# Patient Record
Sex: MALE | Race: WHITE | NOT HISPANIC OR LATINO | Employment: OTHER | URBAN - METROPOLITAN AREA
[De-identification: names, ages, dates, MRNs, and addresses within clinical notes are randomized per-mention and may not be internally consistent; named-entity substitution may affect disease eponyms.]

---

## 2023-05-02 DIAGNOSIS — M50.90 CERVICAL DISC DISEASE: ICD-10-CM

## 2023-05-02 PROBLEM — M19.90 ARTHRITIS: Status: ACTIVE | Noted: 2023-05-02

## 2023-05-02 RX ORDER — MELOXICAM 7.5 MG/1
1 TABLET ORAL DAILY
COMMUNITY
Start: 2016-10-03 | End: 2023-05-02 | Stop reason: SDUPTHER

## 2023-05-02 RX ORDER — MELOXICAM 7.5 MG/1
7.5 TABLET ORAL DAILY
Qty: 30 TABLET | Refills: 0 | Status: SHIPPED | OUTPATIENT
Start: 2023-05-02 | End: 2023-06-26 | Stop reason: DRUGHIGH

## 2023-05-26 ENCOUNTER — APPOINTMENT (OUTPATIENT)
Dept: PRIMARY CARE | Facility: CLINIC | Age: 78
End: 2023-05-26
Payer: MEDICARE

## 2023-06-22 PROBLEM — I10 BENIGN ESSENTIAL HYPERTENSION: Status: ACTIVE | Noted: 2023-06-22

## 2023-06-22 PROBLEM — R79.9 ABNORMAL BLOOD CHEMISTRY: Status: ACTIVE | Noted: 2023-06-22

## 2023-06-22 PROBLEM — G47.00 INSOMNIA: Status: ACTIVE | Noted: 2023-06-22

## 2023-06-22 PROBLEM — E04.1 THYROID NODULE: Status: ACTIVE | Noted: 2023-06-22

## 2023-06-22 PROBLEM — M51.369 DEGENERATIVE DISC DISEASE, LUMBAR: Status: ACTIVE | Noted: 2023-06-22

## 2023-06-22 PROBLEM — M25.469 KNEE SWELLING: Status: ACTIVE | Noted: 2023-06-22

## 2023-06-22 PROBLEM — I25.84 CORONARY ARTERY CALCIFICATION: Status: ACTIVE | Noted: 2023-06-22

## 2023-06-22 PROBLEM — M67.912 DISORDER OF LEFT ROTATOR CUFF: Status: ACTIVE | Noted: 2023-06-22

## 2023-06-22 PROBLEM — R73.02 IGT (IMPAIRED GLUCOSE TOLERANCE): Status: ACTIVE | Noted: 2023-06-22

## 2023-06-22 PROBLEM — R94.31 ABNORMAL ELECTROCARDIOGRAM: Status: ACTIVE | Noted: 2023-06-22

## 2023-06-22 PROBLEM — M62.830 LUMBAR PARASPINAL MUSCLE SPASM: Status: ACTIVE | Noted: 2023-06-22

## 2023-06-22 PROBLEM — E78.5 DYSLIPIDEMIA: Status: ACTIVE | Noted: 2023-06-22

## 2023-06-22 PROBLEM — K63.5 COLON POLYP: Status: ACTIVE | Noted: 2023-06-22

## 2023-06-22 PROBLEM — I25.10 CORONARY ARTERY CALCIFICATION: Status: ACTIVE | Noted: 2023-06-22

## 2023-06-22 PROBLEM — R10.9 FLANK PAIN: Status: ACTIVE | Noted: 2023-06-22

## 2023-06-22 PROBLEM — M17.9 OSTEOARTHRITIS, KNEE: Status: ACTIVE | Noted: 2023-06-22

## 2023-06-22 PROBLEM — N40.0 BPH (BENIGN PROSTATIC HYPERPLASIA): Status: ACTIVE | Noted: 2023-06-22

## 2023-06-22 PROBLEM — C84.00: Status: ACTIVE | Noted: 2023-06-22

## 2023-06-22 PROBLEM — M51.36 DEGENERATIVE DISC DISEASE, LUMBAR: Status: ACTIVE | Noted: 2023-06-22

## 2023-06-22 PROBLEM — Z96.652 STATUS POST TOTAL LEFT KNEE REPLACEMENT: Status: ACTIVE | Noted: 2023-06-22

## 2023-06-22 PROBLEM — S43.004S SHOULDER DISLOCATION, RIGHT, SEQUELA: Status: ACTIVE | Noted: 2023-06-22

## 2023-06-22 PROBLEM — M25.519 SHOULDER PAIN: Status: ACTIVE | Noted: 2023-06-22

## 2023-06-22 PROBLEM — K76.89 LIVER CYST: Status: ACTIVE | Noted: 2023-06-22

## 2023-06-22 PROBLEM — S41.119A SKIN TEAR OF UPPER EXTREMITY: Status: ACTIVE | Noted: 2023-06-22

## 2023-06-22 PROBLEM — H93.13 TINNITUS OF BOTH EARS: Status: ACTIVE | Noted: 2023-06-22

## 2023-06-22 PROBLEM — M25.562 PAIN IN LEFT KNEE: Status: ACTIVE | Noted: 2023-06-22

## 2023-06-22 PROBLEM — M25.571 ACUTE RIGHT ANKLE PAIN: Status: ACTIVE | Noted: 2023-06-22

## 2023-06-22 PROBLEM — H90.3 SENSORINEURAL HEARING LOSS (SNHL) OF BOTH EARS: Status: ACTIVE | Noted: 2023-06-22

## 2023-06-22 PROBLEM — R22.30 MASS OF SHOULDER REGION: Status: ACTIVE | Noted: 2023-06-22

## 2023-06-22 PROBLEM — M25.473 ANKLE SWELLING: Status: ACTIVE | Noted: 2023-06-22

## 2023-06-22 PROBLEM — M11.20 CHONDROCALCINOSIS: Status: ACTIVE | Noted: 2023-06-22

## 2023-06-22 PROBLEM — N52.9 INABILITY TO ATTAIN ERECTION: Status: ACTIVE | Noted: 2023-06-22

## 2023-06-22 PROBLEM — M17.12 PRIMARY OSTEOARTHRITIS OF LEFT KNEE: Status: ACTIVE | Noted: 2023-06-22

## 2023-06-22 PROBLEM — E55.9 MILD VITAMIN D DEFICIENCY: Status: ACTIVE | Noted: 2023-06-22

## 2023-06-22 PROBLEM — M88.9 PAGET DISEASE OF BONE: Status: ACTIVE | Noted: 2023-06-22

## 2023-06-22 PROBLEM — L57.0 ACTINIC KERATOSIS: Status: ACTIVE | Noted: 2023-06-22

## 2023-06-22 PROBLEM — M54.30 SCIATICA: Status: ACTIVE | Noted: 2023-06-22

## 2023-06-22 PROBLEM — J06.9 ACUTE URI: Status: ACTIVE | Noted: 2023-06-22

## 2023-06-22 RX ORDER — PANTOPRAZOLE SODIUM 40 MG/1
TABLET, DELAYED RELEASE ORAL
COMMUNITY
Start: 2022-01-17 | End: 2023-06-26 | Stop reason: SDUPTHER

## 2023-06-22 RX ORDER — ROSUVASTATIN CALCIUM 10 MG/1
1 TABLET, COATED ORAL NIGHTLY
COMMUNITY
Start: 2019-07-09 | End: 2023-06-26 | Stop reason: SDUPTHER

## 2023-06-22 RX ORDER — SILDENAFIL 100 MG/1
TABLET, FILM COATED ORAL
COMMUNITY
Start: 2019-04-24

## 2023-06-26 ENCOUNTER — OFFICE VISIT (OUTPATIENT)
Dept: PRIMARY CARE | Facility: CLINIC | Age: 78
End: 2023-06-26
Payer: MEDICARE

## 2023-06-26 VITALS
HEIGHT: 65 IN | BODY MASS INDEX: 33.99 KG/M2 | RESPIRATION RATE: 14 BRPM | DIASTOLIC BLOOD PRESSURE: 72 MMHG | OXYGEN SATURATION: 96 % | WEIGHT: 204 LBS | SYSTOLIC BLOOD PRESSURE: 122 MMHG | HEART RATE: 61 BPM

## 2023-06-26 DIAGNOSIS — G47.00 INSOMNIA, UNSPECIFIED TYPE: ICD-10-CM

## 2023-06-26 DIAGNOSIS — E55.9 VITAMIN D DEFICIENCY: ICD-10-CM

## 2023-06-26 DIAGNOSIS — N52.9 VASCULOGENIC ERECTILE DYSFUNCTION, UNSPECIFIED VASCULOGENIC ERECTILE DYSFUNCTION TYPE: ICD-10-CM

## 2023-06-26 DIAGNOSIS — R35.1 NOCTURIA: ICD-10-CM

## 2023-06-26 DIAGNOSIS — M50.90 CERVICAL DISC DISEASE: ICD-10-CM

## 2023-06-26 DIAGNOSIS — M19.079 ANKLE ARTHRITIS: Primary | ICD-10-CM

## 2023-06-26 DIAGNOSIS — E78.5 DYSLIPIDEMIA: ICD-10-CM

## 2023-06-26 DIAGNOSIS — K22.2 SCHATZKI'S RING: ICD-10-CM

## 2023-06-26 PROCEDURE — 3078F DIAST BP <80 MM HG: CPT | Performed by: INTERNAL MEDICINE

## 2023-06-26 PROCEDURE — 1036F TOBACCO NON-USER: CPT | Performed by: INTERNAL MEDICINE

## 2023-06-26 PROCEDURE — 1159F MED LIST DOCD IN RCRD: CPT | Performed by: INTERNAL MEDICINE

## 2023-06-26 PROCEDURE — 99213 OFFICE O/P EST LOW 20 MIN: CPT | Performed by: INTERNAL MEDICINE

## 2023-06-26 PROCEDURE — 3074F SYST BP LT 130 MM HG: CPT | Performed by: INTERNAL MEDICINE

## 2023-06-26 RX ORDER — DOCUSATE SODIUM 50 MG/5ML
50 LIQUID ORAL
COMMUNITY

## 2023-06-26 RX ORDER — TRAZODONE HYDROCHLORIDE 50 MG/1
50 TABLET ORAL NIGHTLY PRN
Qty: 90 TABLET | Refills: 3 | Status: SHIPPED | OUTPATIENT
Start: 2023-06-26 | End: 2024-06-25

## 2023-06-26 RX ORDER — CHOLECALCIFEROL (VITAMIN D3) 50 MCG
50 TABLET ORAL DAILY
COMMUNITY

## 2023-06-26 RX ORDER — MELOXICAM 7.5 MG/1
7.5 TABLET ORAL DAILY
Qty: 90 TABLET | Refills: 3 | Status: SHIPPED | OUTPATIENT
Start: 2023-06-26 | End: 2024-06-03

## 2023-06-26 RX ORDER — SILDENAFIL 50 MG/1
50 TABLET, FILM COATED ORAL DAILY PRN
Qty: 6 TABLET | Refills: 3 | Status: SHIPPED | OUTPATIENT
Start: 2023-06-26 | End: 2024-06-25

## 2023-06-26 RX ORDER — ROSUVASTATIN CALCIUM 10 MG/1
10 TABLET, COATED ORAL NIGHTLY
Qty: 90 TABLET | Refills: 3 | Status: SHIPPED | OUTPATIENT
Start: 2023-06-26 | End: 2024-06-03

## 2023-06-26 RX ORDER — PANTOPRAZOLE SODIUM 40 MG/1
40 TABLET, DELAYED RELEASE ORAL
Qty: 90 TABLET | Refills: 3 | Status: SHIPPED | OUTPATIENT
Start: 2023-06-26 | End: 2024-06-03

## 2023-06-26 ASSESSMENT — PATIENT HEALTH QUESTIONNAIRE - PHQ9
SUM OF ALL RESPONSES TO PHQ9 QUESTIONS 1 AND 2: 0
2. FEELING DOWN, DEPRESSED OR HOPELESS: NOT AT ALL
1. LITTLE INTEREST OR PLEASURE IN DOING THINGS: NOT AT ALL

## 2023-06-26 NOTE — PROGRESS NOTES
"Subjective   Collins Acevedo is a 77 y.o. male who presents for NP ESTABLISH    HX OF ARTHRITIS BACK PAIN   NERVOUS LEGS AT NIGHT THC MINTS OR EDIBLES HELP   RFS NEEDED       HPI   [place].    NO PROBLEMS TO TALK ABOUT    TAKE MELOXICAM FOR YEARS.  HELPS WITH ARTHRITIS OF ANKLE AND WRIST    ALSO TAKE AS NEEDED OTC NSAIDS ON BAD DAYS    ED     HAD SCHATZKI RING DILATED IN THE PAST    TROUBLE SLEEPING    Review of Systems   Constitutional:  Negative for chills, diaphoresis and fever.   Respiratory:  Negative for cough and shortness of breath.    Cardiovascular:  Negative for chest pain and leg swelling.   Gastrointestinal:  Negative for constipation, diarrhea, nausea and vomiting.   Musculoskeletal:  Negative for joint swelling and myalgias.        ARTHRITIS OF MULTIPLE SITES INCLUDING BACK       Objective   /72   Pulse 61   Resp 14   Ht 1.651 m (5' 5\")   Wt 92.5 kg (204 lb)   SpO2 96%   BMI 33.95 kg/m²     Physical Exam  Vitals reviewed.   Constitutional:       General: He is not in acute distress.     Appearance: He is not ill-appearing.   Cardiovascular:      Rate and Rhythm: Normal rate and regular rhythm.      Pulses: Normal pulses.      Heart sounds:      No gallop.   Pulmonary:      Breath sounds: Normal breath sounds. No wheezing, rhonchi or rales.   Abdominal:      General: Abdomen is flat. Bowel sounds are normal.      Palpations: Abdomen is soft.      Tenderness: There is no guarding or rebound.   Musculoskeletal:      Right lower leg: No edema.      Left lower leg: No edema.         Assessment/Plan   Problem List Items Addressed This Visit       Cervical disc disease    Relevant Medications    meloxicam (Mobic) 7.5 mg tablet    Dyslipidemia    Relevant Medications    rosuvastatin (Crestor) 10 mg tablet    Other Relevant Orders    Vitamin B12    Lipid Panel    TSH with reflex to Free T4 if abnormal    Comprehensive Metabolic Panel    CBC    Insomnia    Relevant " Medications    traZODone (Desyrel) 50 mg tablet     Other Visit Diagnoses       Ankle arthritis    -  Primary    Schatzki's ring        Relevant Medications    pantoprazole (ProtoNix) 40 mg EC tablet    Vitamin D deficiency        Relevant Orders    Vitamin D, Total    Nocturia        Relevant Orders    Prostate Specific Antigen, Screen    Vasculogenic erectile dysfunction, unspecified vasculogenic erectile dysfunction type        Relevant Medications    sildenafil (Viagra) 50 mg tablet          Patient Instructions    REFILLS ARE SENT IN FOR YOU AS REQUESTED TO [facility]    2.  IF NEEDED TRAZODONE LOW DOSE CAN BE USED FOR LONG TERM DIFFICULTY SLEEPING, SCRIPT SENT TO [facility] FOR YOU    3.  SINCE YOU ARE USING AS NEEDED ANTI-INFLAMMATORY AGENTS ON OCCASION, RECOMMEND REDUCING THE MELOXICAM TO 7.5 MG DAILY.  TYLENOL IS ALSO ACCEPTABLE FOR OCCASIONAL USE WITH YOUR LEVEL OF ALCOHOL CONSUMPTION    4.  FASTING LABS ARE ORDERED FOR YOU IN PREPARATION FOR YOUR UPCOMING PHYSICAL    5.  SILDENAFIL (GENERIC VIAGRA) IS SENT IN TO LOCAL PHARMACY FOR YOU    6. FOLLOW UP FOR PHYSICAL

## 2023-06-26 NOTE — PATIENT INSTRUCTIONS
REFILLS ARE SENT IN FOR YOU AS REQUESTED TO [facility]    2.  IF NEEDED TRAZODONE LOW DOSE CAN BE USED FOR LONG TERM DIFFICULTY SLEEPING, SCRIPT SENT TO [facility] FOR YOU    3.  SINCE YOU ARE USING AS NEEDED ANTI-INFLAMMATORY AGENTS ON OCCASION, RECOMMEND REDUCING THE MELOXICAM TO 7.5 MG DAILY.  TYLENOL IS ALSO ACCEPTABLE FOR OCCASIONAL USE WITH YOUR LEVEL OF ALCOHOL CONSUMPTION    4.  FASTING LABS ARE ORDERED FOR YOU IN PREPARATION FOR YOUR UPCOMING PHYSICAL    5.  SILDENAFIL (GENERIC VIAGRA) IS SENT IN TO LOCAL PHARMACY FOR YOU    6. FOLLOW UP FOR PHYSICAL

## 2023-06-27 ASSESSMENT — ENCOUNTER SYMPTOMS
SHORTNESS OF BREATH: 0
JOINT SWELLING: 0
NAUSEA: 0
COUGH: 0
VOMITING: 0
CHILLS: 0
DIAPHORESIS: 0
DIARRHEA: 0
MYALGIAS: 0
CONSTIPATION: 0
FEVER: 0

## 2023-07-25 ENCOUNTER — LAB (OUTPATIENT)
Dept: LAB | Facility: LAB | Age: 78
End: 2023-07-25
Payer: MEDICARE

## 2023-07-25 DIAGNOSIS — E53.8 VITAMIN B12 DEFICIENCY: Primary | ICD-10-CM

## 2023-07-25 DIAGNOSIS — E78.5 DYSLIPIDEMIA: ICD-10-CM

## 2023-07-25 DIAGNOSIS — E55.9 VITAMIN D DEFICIENCY: ICD-10-CM

## 2023-07-25 DIAGNOSIS — R35.1 NOCTURIA: ICD-10-CM

## 2023-07-25 LAB
ALANINE AMINOTRANSFERASE (SGPT) (U/L) IN SER/PLAS: 19 U/L (ref 10–52)
ALBUMIN (G/DL) IN SER/PLAS: 4.1 G/DL (ref 3.4–5)
ALKALINE PHOSPHATASE (U/L) IN SER/PLAS: 79 U/L (ref 33–136)
ANION GAP IN SER/PLAS: 10 MMOL/L (ref 10–20)
ASPARTATE AMINOTRANSFERASE (SGOT) (U/L) IN SER/PLAS: 21 U/L (ref 9–39)
BILIRUBIN TOTAL (MG/DL) IN SER/PLAS: 0.6 MG/DL (ref 0–1.2)
CALCIDIOL (25 OH VITAMIN D3) (NG/ML) IN SER/PLAS: 56 NG/ML
CALCIUM (MG/DL) IN SER/PLAS: 9.1 MG/DL (ref 8.6–10.3)
CARBON DIOXIDE, TOTAL (MMOL/L) IN SER/PLAS: 27 MMOL/L (ref 21–32)
CHLORIDE (MMOL/L) IN SER/PLAS: 107 MMOL/L (ref 98–107)
CHOLESTEROL (MG/DL) IN SER/PLAS: 105 MG/DL (ref 0–199)
CHOLESTEROL IN HDL (MG/DL) IN SER/PLAS: 52.1 MG/DL
CHOLESTEROL/HDL RATIO: 2
COBALAMIN (VITAMIN B12) (PG/ML) IN SER/PLAS: 145 PG/ML (ref 211–911)
CREATININE (MG/DL) IN SER/PLAS: 0.94 MG/DL (ref 0.5–1.3)
ERYTHROCYTE DISTRIBUTION WIDTH (RATIO) BY AUTOMATED COUNT: 13.5 % (ref 11.5–14.5)
ERYTHROCYTE MEAN CORPUSCULAR HEMOGLOBIN CONCENTRATION (G/DL) BY AUTOMATED: 32.2 G/DL (ref 32–36)
ERYTHROCYTE MEAN CORPUSCULAR VOLUME (FL) BY AUTOMATED COUNT: 94 FL (ref 80–100)
ERYTHROCYTES (10*6/UL) IN BLOOD BY AUTOMATED COUNT: 4.61 X10E12/L (ref 4.5–5.9)
GFR MALE: 83 ML/MIN/1.73M2
GLUCOSE (MG/DL) IN SER/PLAS: 91 MG/DL (ref 74–99)
HEMATOCRIT (%) IN BLOOD BY AUTOMATED COUNT: 43.5 % (ref 41–52)
HEMOGLOBIN (G/DL) IN BLOOD: 14 G/DL (ref 13.5–17.5)
LDL: 34 MG/DL (ref 0–99)
LEUKOCYTES (10*3/UL) IN BLOOD BY AUTOMATED COUNT: 8.6 X10E9/L (ref 4.4–11.3)
PLATELETS (10*3/UL) IN BLOOD AUTOMATED COUNT: 212 X10E9/L (ref 150–450)
POTASSIUM (MMOL/L) IN SER/PLAS: 4.3 MMOL/L (ref 3.5–5.3)
PROSTATE SPECIFIC ANTIGEN,SCREEN: 1.92 NG/ML (ref 0–4)
PROTEIN TOTAL: 6.2 G/DL (ref 6.4–8.2)
SODIUM (MMOL/L) IN SER/PLAS: 140 MMOL/L (ref 136–145)
THYROTROPIN (MIU/L) IN SER/PLAS BY DETECTION LIMIT <= 0.05 MIU/L: 3.06 MIU/L (ref 0.44–3.98)
TRIGLYCERIDE (MG/DL) IN SER/PLAS: 97 MG/DL (ref 0–149)
UREA NITROGEN (MG/DL) IN SER/PLAS: 22 MG/DL (ref 6–23)
VLDL: 19 MG/DL (ref 0–40)

## 2023-07-25 PROCEDURE — 82607 VITAMIN B-12: CPT

## 2023-07-25 PROCEDURE — 36415 COLL VENOUS BLD VENIPUNCTURE: CPT

## 2023-07-25 PROCEDURE — 80053 COMPREHEN METABOLIC PANEL: CPT

## 2023-07-25 PROCEDURE — 84153 ASSAY OF PSA TOTAL: CPT

## 2023-07-25 PROCEDURE — 85027 COMPLETE CBC AUTOMATED: CPT

## 2023-07-25 PROCEDURE — 84443 ASSAY THYROID STIM HORMONE: CPT

## 2023-07-25 PROCEDURE — 80061 LIPID PANEL: CPT

## 2023-07-25 PROCEDURE — 82306 VITAMIN D 25 HYDROXY: CPT

## 2023-07-26 ENCOUNTER — OFFICE VISIT (OUTPATIENT)
Dept: PRIMARY CARE | Facility: CLINIC | Age: 78
End: 2023-07-26
Payer: MEDICARE

## 2023-07-26 VITALS
SYSTOLIC BLOOD PRESSURE: 130 MMHG | RESPIRATION RATE: 14 BRPM | DIASTOLIC BLOOD PRESSURE: 78 MMHG | BODY MASS INDEX: 33.66 KG/M2 | WEIGHT: 202 LBS | HEIGHT: 65 IN | OXYGEN SATURATION: 98 %

## 2023-07-26 DIAGNOSIS — R07.81 RIB PAIN ON LEFT SIDE: ICD-10-CM

## 2023-07-26 DIAGNOSIS — Z00.00 ROUTINE GENERAL MEDICAL EXAMINATION AT HEALTH CARE FACILITY: ICD-10-CM

## 2023-07-26 DIAGNOSIS — Z23 NEED FOR VACCINATION: ICD-10-CM

## 2023-07-26 DIAGNOSIS — Z00.00 HEALTHCARE MAINTENANCE: ICD-10-CM

## 2023-07-26 DIAGNOSIS — B00.9 HERPES SIMPLEX: ICD-10-CM

## 2023-07-26 DIAGNOSIS — Z00.00 MEDICARE ANNUAL WELLNESS VISIT, SUBSEQUENT: Primary | ICD-10-CM

## 2023-07-26 DIAGNOSIS — M54.6 ACUTE LEFT-SIDED THORACIC BACK PAIN: ICD-10-CM

## 2023-07-26 PROCEDURE — G0439 PPPS, SUBSEQ VISIT: HCPCS | Performed by: INTERNAL MEDICINE

## 2023-07-26 PROCEDURE — 1036F TOBACCO NON-USER: CPT | Performed by: INTERNAL MEDICINE

## 2023-07-26 PROCEDURE — 1170F FXNL STATUS ASSESSED: CPT | Performed by: INTERNAL MEDICINE

## 2023-07-26 PROCEDURE — 90677 PCV20 VACCINE IM: CPT | Performed by: INTERNAL MEDICINE

## 2023-07-26 PROCEDURE — G0009 ADMIN PNEUMOCOCCAL VACCINE: HCPCS | Performed by: INTERNAL MEDICINE

## 2023-07-26 PROCEDURE — 3078F DIAST BP <80 MM HG: CPT | Performed by: INTERNAL MEDICINE

## 2023-07-26 PROCEDURE — 3075F SYST BP GE 130 - 139MM HG: CPT | Performed by: INTERNAL MEDICINE

## 2023-07-26 PROCEDURE — 1159F MED LIST DOCD IN RCRD: CPT | Performed by: INTERNAL MEDICINE

## 2023-07-26 PROCEDURE — 99212 OFFICE O/P EST SF 10 MIN: CPT | Performed by: INTERNAL MEDICINE

## 2023-07-26 RX ORDER — VALACYCLOVIR HYDROCHLORIDE 500 MG/1
500 TABLET, FILM COATED ORAL 2 TIMES DAILY
Qty: 6 TABLET | Refills: 5 | Status: SHIPPED | OUTPATIENT
Start: 2023-07-26 | End: 2024-01-22

## 2023-07-26 ASSESSMENT — ACTIVITIES OF DAILY LIVING (ADL)
DRESSING: INDEPENDENT
DRESSING: INDEPENDENT
GROCERY_SHOPPING: INDEPENDENT
BATHING: INDEPENDENT
MANAGING_FINANCES: INDEPENDENT
TAKING_MEDICATION: INDEPENDENT
DOING_HOUSEWORK: INDEPENDENT
BATHING: INDEPENDENT

## 2023-07-26 ASSESSMENT — ENCOUNTER SYMPTOMS
MYALGIAS: 0
BACK PAIN: 1
DIAPHORESIS: 0
FEVER: 0
DIARRHEA: 0
SHORTNESS OF BREATH: 0
JOINT SWELLING: 0
NAUSEA: 0
VOMITING: 0
CHILLS: 0
CONSTIPATION: 0
COUGH: 0

## 2023-07-26 NOTE — PROGRESS NOTES
"Subjective   Reason for Visit: Collins Acevedo is an 77 y.o. male here for a Medicare Wellness visit.   C/O BACK PAIN PER PT HAS BEEN DOING A LOT OF WORK AROUND HOUSE     Past Medical, Surgical, and Family History reviewed and updated in chart.    Reviewed all medications by prescribing practitioner or clinical pharmacist (such as prescriptions, OTCs, herbal therapies and supplements) and documented in the medical record.    HPI  WAS POURING CONCRETE.  SHOVELLING BACK HURTS    NO LEG WEAKNESS OR PAIN SHOOTING DOWN LEGS    PAIN LEFT UPPER BACK, DOESN'T SEEM LIKE A MUSCLE CRAMP    TAKE TYLENOL, AND SPOUSE PUT CARISA JASSO ON IT    NOT TRIED VIAGRA YET    LABS ALREADY DONE    PNEUMOVAX LAST 2016 DUE    SHINGRIX DONE 2023    TDAP 2019    CT ABD NEG AORTA 2016  COLONOSCOPY 2019, NEXT 2024  HIV N NOT INDICATED    Patient Care Team:  Shashi Causey MD as PCP - General (Internal Medicine)  Franky Perales MD as PCP - [employer] PCP     Review of Systems   Constitutional:  Negative for chills, diaphoresis and fever.   Respiratory:  Negative for cough and shortness of breath.    Cardiovascular:  Negative for chest pain and leg swelling.   Gastrointestinal:  Negative for constipation, diarrhea, nausea and vomiting.   Musculoskeletal:  Positive for back pain. Negative for joint swelling and myalgias.       Objective   Vitals:  /78   Resp 14   Ht 1.651 m (5' 5\")   Wt 91.6 kg (202 lb)   SpO2 98%   BMI 33.61 kg/m²       Physical Exam  Vitals reviewed.   Constitutional:       General: He is not in acute distress.     Appearance: He is not ill-appearing.   Cardiovascular:      Rate and Rhythm: Normal rate and regular rhythm.      Pulses: Normal pulses.      Heart sounds:      No gallop.   Pulmonary:      Breath sounds: Normal breath sounds. No wheezing, rhonchi or rales.   Abdominal:      General: Abdomen is flat. Bowel sounds are normal.      Palpations: Abdomen is soft.      Tenderness: There is no " guarding or rebound.   Musculoskeletal:      Right lower leg: No edema.      Left lower leg: No edema.      Comments: PAIN LOCALIZED LEFT UPPER BACK/FLANK REGION, NO MUSCLE SPASM IS PALPATED, NO RIB STEP OFF IS PALPATED   Neurological:      General: No focal deficit present.      Mental Status: He is oriented to person, place, and time. Mental status is at baseline.         Assessment/Plan   Problem List Items Addressed This Visit       Medicare annual wellness visit, subsequent - Primary     Other Visit Diagnoses       Acute left-sided thoracic back pain        Relevant Orders    XR thoracic spine 3 views    Rib pain on left side        Relevant Orders    XR ribs left 2 views    Herpes simplex        Relevant Medications    valACYclovir (Valtrex) 500 mg tablet    Need for vaccination        Routine general medical examination at health care facility        Healthcare maintenance        Relevant Orders    Pneumococcal conjugate vaccine, 20-valent, adult (PREVNAR 20) (Completed)          Patient Instructions    RECOMMEND START VITAMIN B-12 1000 MCG ORALLY DAILY, AND AFTER YOU'VE BEEN ON IT FOR 8 WEEKS, THEN GET REPEAT BLOOD TEST TO MAKE SURE THE LEVEL COMES UP.    2.  OTHER THAN UPDATED PREVNAR-20 PNEUMONIA IMMUNIZATION WHICH WILL BE ADMINISTERED TODAY, YOU ARE CAUGHT UP ON MEDICARE HEALTH SCREENINGS    3.  REGULAR YEARLY EYE EXAMS ARE RECOMMENDED    4.  PLEASE CALL IF REFILLS ARE NEEDED    5.  FOLLOW UP YEARLY OR AS NEEDED.    6.  XRAYS ORDERED FOR BACK AND RIBS GIVEN THE PAIN YOU ARE EXPERIENCING

## 2023-07-26 NOTE — PATIENT INSTRUCTIONS
RECOMMEND START VITAMIN B-12 1000 MCG ORALLY DAILY, AND AFTER YOU'VE BEEN ON IT FOR 8 WEEKS, THEN GET REPEAT BLOOD TEST TO MAKE SURE THE LEVEL COMES UP.    2.  OTHER THAN UPDATED PREVNAR-20 PNEUMONIA IMMUNIZATION WHICH WILL BE ADMINISTERED TODAY, YOU ARE CAUGHT UP ON MEDICARE HEALTH SCREENINGS    3.  REGULAR YEARLY EYE EXAMS ARE RECOMMENDED    4.  PLEASE CALL IF REFILLS ARE NEEDED    5.  FOLLOW UP YEARLY OR AS NEEDED.    6.  XRAYS ORDERED FOR BACK AND RIBS GIVEN THE PAIN YOU ARE EXPERIENCING

## 2023-09-18 ENCOUNTER — LAB (OUTPATIENT)
Dept: LAB | Facility: LAB | Age: 78
End: 2023-09-18
Payer: MEDICARE

## 2023-09-18 DIAGNOSIS — E53.8 VITAMIN B12 DEFICIENCY: ICD-10-CM

## 2023-09-18 LAB — COBALAMIN (VITAMIN B12) (PG/ML) IN SER/PLAS: 291 PG/ML (ref 211–911)

## 2023-09-18 PROCEDURE — 82607 VITAMIN B-12: CPT

## 2023-09-18 PROCEDURE — 36415 COLL VENOUS BLD VENIPUNCTURE: CPT

## 2023-10-08 PROBLEM — M25.571 CHRONIC PAIN OF RIGHT ANKLE: Status: ACTIVE | Noted: 2018-10-18

## 2023-10-08 PROBLEM — K21.9 GERD (GASTROESOPHAGEAL REFLUX DISEASE): Status: ACTIVE | Noted: 2021-10-18

## 2023-10-08 PROBLEM — M85.611: Status: ACTIVE | Noted: 2021-10-08

## 2023-10-08 PROBLEM — D18.00 ANGIOMA: Status: ACTIVE | Noted: 2021-10-04

## 2023-10-08 PROBLEM — M75.120 COMPLETE TEAR OF ROTATOR CUFF: Status: ACTIVE | Noted: 2018-07-10

## 2023-10-08 PROBLEM — B35.3 TINEA PEDIS: Status: ACTIVE | Noted: 2021-10-04

## 2023-10-08 PROBLEM — D22.5 MELANOCYTIC NEVI OF TRUNK: Status: ACTIVE | Noted: 2022-10-17

## 2023-10-08 PROBLEM — M25.551 PAIN IN RIGHT HIP: Status: ACTIVE | Noted: 2018-08-31

## 2023-10-08 PROBLEM — G89.29 CHRONIC PAIN OF LEFT KNEE: Status: ACTIVE | Noted: 2018-10-03

## 2023-10-08 PROBLEM — E78.5 HYPERLIPIDEMIA: Status: ACTIVE | Noted: 2021-10-18

## 2023-10-08 PROBLEM — S99.929A FOOT INJURY: Status: ACTIVE | Noted: 2023-10-08

## 2023-10-08 PROBLEM — H81.399 PERIPHERAL VERTIGO: Status: ACTIVE | Noted: 2019-06-17

## 2023-10-08 PROBLEM — M25.69 BACK STIFFNESS: Status: ACTIVE | Noted: 2018-08-31

## 2023-10-08 PROBLEM — D48.5 NEOPLASM OF UNCERTAIN BEHAVIOR OF SKIN: Status: ACTIVE | Noted: 2020-02-25

## 2023-10-08 PROBLEM — D22.70 MELANOCYTIC NEVI OF UNSPECIFIED LOWER LIMB, INCLUDING HIP: Status: ACTIVE | Noted: 2022-10-17

## 2023-10-08 PROBLEM — M12.811 ROTATOR CUFF TEAR ARTHROPATHY OF RIGHT SHOULDER: Status: ACTIVE | Noted: 2021-10-08

## 2023-10-08 PROBLEM — D22.60 MELANOCYTIC NEVI OF UNSPECIFIED UPPER LIMB, INCLUDING SHOULDER: Status: ACTIVE | Noted: 2022-10-17

## 2023-10-08 PROBLEM — M72.2 PLANTAR FASCIITIS: Status: ACTIVE | Noted: 2023-10-08

## 2023-10-08 PROBLEM — G89.29 CHRONIC PAIN OF RIGHT ANKLE: Status: ACTIVE | Noted: 2018-10-18

## 2023-10-08 PROBLEM — M75.101 ROTATOR CUFF TEAR ARTHROPATHY OF RIGHT SHOULDER: Status: ACTIVE | Noted: 2021-10-08

## 2023-10-08 PROBLEM — D22.39 MELANOCYTIC NEVI OF OTHER PARTS OF FACE: Status: ACTIVE | Noted: 2018-07-24

## 2023-10-08 PROBLEM — R53.1 WEAKNESS: Status: ACTIVE | Noted: 2018-07-26

## 2023-10-08 PROBLEM — M88.9 PAGET'S DISEASE OF BONE: Status: ACTIVE | Noted: 2023-10-08

## 2023-10-08 PROBLEM — M25.562 CHRONIC PAIN OF LEFT KNEE: Status: ACTIVE | Noted: 2018-10-03

## 2023-10-08 PROBLEM — L73.9 FOLLICULAR DISORDER, UNSPECIFIED: Status: ACTIVE | Noted: 2021-10-04

## 2023-10-08 RX ORDER — DOCUSATE SODIUM 100 MG/1
100 CAPSULE, LIQUID FILLED ORAL EVERY 12 HOURS PRN
COMMUNITY
Start: 2021-10-21

## 2023-10-08 RX ORDER — CHLORHEXIDINE GLUCONATE 40 MG/ML
SOLUTION TOPICAL
COMMUNITY
Start: 2017-10-18

## 2023-10-08 RX ORDER — FLUOCINONIDE 0.5 MG/G
1 CREAM TOPICAL
COMMUNITY
Start: 2018-07-24

## 2023-10-08 RX ORDER — CYCLOBENZAPRINE HCL 10 MG
10 TABLET ORAL 3 TIMES DAILY PRN
COMMUNITY
Start: 2017-04-28

## 2023-10-08 RX ORDER — OFLOXACIN 3 MG/ML
1 SOLUTION/ DROPS OPHTHALMIC 4 TIMES DAILY
COMMUNITY
Start: 2023-05-30

## 2023-10-08 RX ORDER — ZOLPIDEM TARTRATE 5 MG/1
TABLET ORAL
COMMUNITY
Start: 2013-11-21

## 2023-10-08 RX ORDER — ORPHENADRINE CITRATE 100 MG/1
TABLET, EXTENDED RELEASE ORAL
COMMUNITY
Start: 2016-08-12

## 2023-10-08 RX ORDER — PREDNISONE 10 MG/1
TABLET ORAL
COMMUNITY
Start: 2016-08-22

## 2023-10-08 RX ORDER — MUPIROCIN 20 MG/G
OINTMENT TOPICAL
COMMUNITY
Start: 2017-10-18

## 2023-10-08 RX ORDER — PRAMIPEXOLE DIHYDROCHLORIDE 0.12 MG/1
TABLET ORAL
COMMUNITY
Start: 2021-02-18

## 2023-10-08 RX ORDER — TRAMADOL HYDROCHLORIDE 50 MG/1
TABLET ORAL
COMMUNITY
Start: 2017-10-25

## 2023-10-08 RX ORDER — PREDNISOLONE ACETATE 10 MG/ML
1 SUSPENSION/ DROPS OPHTHALMIC 4 TIMES DAILY
COMMUNITY
Start: 2023-06-27

## 2023-10-08 RX ORDER — DEXTROMETHORPHAN HYDROBROMIDE, GUAIFENESIN 5; 100 MG/5ML; MG/5ML
650 LIQUID ORAL 4 TIMES DAILY
COMMUNITY
Start: 2017-01-10

## 2023-10-08 RX ORDER — SUCRALFATE 1 G/10ML
SUSPENSION ORAL
COMMUNITY
Start: 2020-02-26

## 2023-10-08 RX ORDER — OXYCODONE AND ACETAMINOPHEN 5; 325 MG/1; MG/1
TABLET ORAL
COMMUNITY
Start: 2016-08-17

## 2023-10-08 RX ORDER — MOXIFLOXACIN 5 MG/ML
1 SOLUTION/ DROPS OPHTHALMIC 3 TIMES DAILY
COMMUNITY
Start: 2021-07-23

## 2023-10-08 RX ORDER — IBUPROFEN 800 MG/1
TABLET ORAL EVERY 8 HOURS
COMMUNITY
Start: 2017-03-24

## 2023-10-08 RX ORDER — ACETAMINOPHEN 500 MG
1000 TABLET ORAL EVERY 8 HOURS PRN
COMMUNITY
Start: 2021-10-21

## 2023-10-09 ENCOUNTER — OFFICE VISIT (OUTPATIENT)
Dept: DERMATOLOGY | Facility: CLINIC | Age: 78
End: 2023-10-09
Payer: MEDICARE

## 2023-10-09 DIAGNOSIS — L85.3 XEROSIS CUTIS: ICD-10-CM

## 2023-10-09 DIAGNOSIS — B35.3 TINEA PEDIS OF LEFT FOOT: ICD-10-CM

## 2023-10-09 DIAGNOSIS — D22.9 MULTIPLE BENIGN MELANOCYTIC NEVI: ICD-10-CM

## 2023-10-09 DIAGNOSIS — L82.1 SEBORRHEIC KERATOSIS: ICD-10-CM

## 2023-10-09 DIAGNOSIS — L57.0 ACTINIC KERATOSIS: Primary | ICD-10-CM

## 2023-10-09 DIAGNOSIS — D18.01 HEMANGIOMA OF SKIN: ICD-10-CM

## 2023-10-09 DIAGNOSIS — L57.8 SUN-DAMAGED SKIN: ICD-10-CM

## 2023-10-09 DIAGNOSIS — B35.3 TINEA PEDIS OF RIGHT FOOT: ICD-10-CM

## 2023-10-09 PROCEDURE — 1159F MED LIST DOCD IN RCRD: CPT | Performed by: DERMATOLOGY

## 2023-10-09 PROCEDURE — 1036F TOBACCO NON-USER: CPT | Performed by: DERMATOLOGY

## 2023-10-09 PROCEDURE — 17000 DESTRUCT PREMALG LESION: CPT | Performed by: DERMATOLOGY

## 2023-10-09 PROCEDURE — 99213 OFFICE O/P EST LOW 20 MIN: CPT | Performed by: DERMATOLOGY

## 2023-10-09 PROCEDURE — 17003 DESTRUCT PREMALG LES 2-14: CPT | Performed by: DERMATOLOGY

## 2023-10-09 NOTE — PROGRESS NOTES
Subjective     Collins Acevedo is a 77 y.o. male who presents for the following: Skin Check.     Review of Systems:  No other skin or systemic complaints other than what is documented elsewhere in the note.    The following portions of the chart were reviewed this encounter and updated as appropriate:       Specialty Problems          Dermatology Problems    Melanocytic nevi of other parts of face    Neoplasm of uncertain behavior of skin    Angioma    Follicular disorder, unspecified    Tinea pedis    Melanocytic nevi of trunk    Melanocytic nevi of unspecified lower limb, including hip    Melanocytic nevi of unspecified upper limb, including shoulder    Actinic keratosis    Pagetoid reticulosis (CMS/HCC)     Past Medical History:  Collins Acevedo  has no past medical history on file.    Past Surgical History:  Collins Acevedo  has a past surgical history that includes XR knee.    Family History:  Patient family history includes Colon cancer in his father; Dementia in his sister; Parkinsonism in his sister.    Social History:  Collins Acevedo  reports that he has quit smoking. His smoking use included cigarettes. He has never used smokeless tobacco. He reports current alcohol use.  Drug: Marijuana.    Allergies:  Penicillins    Current Medications / CAM's:    Current Outpatient Medications:     acetaminophen (Tylenol 8 HOUR) 650 mg ER tablet, Take 1 tablet (650 mg) by mouth 4 times a day. As needed, Disp: , Rfl:     acetaminophen (Tylenol) 500 mg tablet, Take 2 tablets (1,000 mg) by mouth every 8 hours if needed., Disp: , Rfl:     chlorhexidine (Hibiclens) 4 % external liquid, Hibiclens 4 % External Liquid Use ad directed for preoperative shower Quantity: 120 Refills: 0 Ordered: 18-Oct-2017 Marlene Bruno Start : 18-Oct-2017 Active, Disp: , Rfl:     cholecalciferol (Vitamin D3) 50 MCG (2000 UT) tablet, Take 1 tablet (50 mcg) by mouth once daily., Disp: , Rfl:     cyclobenzaprine (Flexeril) 10 mg  tablet, Take 1 tablet (10 mg) by mouth 3 times a day as needed., Disp: , Rfl:     docusate sodium (Colace) 100 mg capsule, Take 1 capsule (100 mg) by mouth every 12 hours if needed for constipation., Disp: , Rfl:     docusate sodium (Colace) 50 mg/5 mL oral liquid, Take 5 mL (50 mg) by mouth., Disp: , Rfl:     ergocalciferol, vitamin D2, (VITAMIN D2 ORAL), Take by mouth once daily., Disp: , Rfl:     fluocinonide 0.05 % cream, 1 Application., Disp: , Rfl:     ibuprofen 800 mg tablet, every 8 hours., Disp: , Rfl:     meloxicam (Mobic) 7.5 mg tablet, Take 1 tablet (7.5 mg) by mouth once daily., Disp: 90 tablet, Rfl: 3    moxifloxacin (Vigamox) 0.5 % ophthalmic solution, Administer 1 drop into the left eye 3 times a day., Disp: , Rfl:     mupirocin (Bactroban) 2 % ointment, Apply topically., Disp: , Rfl:     ofloxacin (Ocuflox) 0.3 % ophthalmic solution, Administer 1 drop into the right eye 4 times a day., Disp: , Rfl:     omega-3/dha/epa/fish oil (OMEGA-3 ORAL), Take by mouth., Disp: , Rfl:     orphenadrine (Norflex) 100 mg 12 hr tablet, [id number] Medication orphenadrine citrate  mg tablet,extended release orphenadrine citrate  mg tablet,extended release 100 mg 8/12/2016 Active (Outside), Disp: , Rfl:     oxyCODONE-acetaminophen (Percocet) 5-325 mg tablet, [id number] Medication oxycodone-acetaminophen 5 mg-325 mg tablet oxycodone-acetaminophen 5 mg-325 mg tablet 5-325 mg 11/18/2017 Active (Outside), Disp: , Rfl:     pantoprazole (ProtoNix) 40 mg EC tablet, Take 1 tablet (40 mg) by mouth once daily in the morning. Take before meals., Disp: 90 tablet, Rfl: 3    pramipexole (Mirapex) 0.125 mg tablet, 1 qhs for 3d, then 2 qhs for 3d, then 3 qhs thereafter, Disp: , Rfl:     prednisoLONE acetate (Pred-Forte) 1 % ophthalmic suspension, Administer 1 drop into the right eye 4 times a day., Disp: , Rfl:     predniSONE (Deltasone) 10 mg tablet, [id number] Medication prednisone 10 mg tablet prednisone 10 mg tablet 10 mg  8/22/2016 Active (Outside), Disp: , Rfl:     rosuvastatin (Crestor) 10 mg tablet, Take 1 tablet (10 mg) by mouth once daily at bedtime., Disp: 90 tablet, Rfl: 3    sildenafil (Viagra) 100 mg tablet, Take by mouth., Disp: , Rfl:     sildenafil (Viagra) 50 mg tablet, Take 1 tablet (50 mg) by mouth once daily as needed for erectile dysfunction., Disp: 6 tablet, Rfl: 3    sucralfate (Carafate) 100 mg/mL suspension, [id number] Medication sucralfate sucralfate 100 mg/mL 2/26/2020 Active (Outside), Disp: , Rfl:     traMADol (Ultram) 50 mg tablet, [id number] Medication tramadol 50 mg tablet tramadol 50 mg tablet 50 mg 10/25/2017 Active (Outside), Disp: , Rfl:     traZODone (Desyrel) 50 mg tablet, Take 1 tablet (50 mg) by mouth as needed at bedtime for sleep., Disp: 90 tablet, Rfl: 3    TURMERIC-TURMERIC ROOT EXTRACT ORAL, Take 2 tablets by mouth once daily., Disp: , Rfl:     valACYclovir (Valtrex) 500 mg tablet, Take 1 tablet (500 mg) by mouth 2 times a day., Disp: 6 tablet, Rfl: 5    zolpidem (Ambien) 5 mg tablet, [id number] Medication zolpidem zolpidem 5 mg 7/10/2015 Active (Outside), Disp: , Rfl:      Objective   Well appearing patient in no apparent distress; mood and affect are within normal limits.    A full examination was performed including scalp, head, eyes, ears, nose, lips, neck, chest, axillae, abdomen, back, buttocks, bilateral upper extremities, bilateral lower extremities, hands, feet, fingers, toes, fingernails, and toenails. All findings within normal limits unless otherwise noted below.    - scattered regular brown macules and papules  - right temple homogenous blue macule    Stuck on verrucous, tan-brown papules and plaques.      Scattered cherry-red papule(s).    Scattered tan macules in sun-exposed areas.    Left Forehead, Left Temple (6)  Erythematous macules with gritty scale.    Right Foot - Anterior  Scaling and maceration between toes and over lateral edges of soles.    Left Foot - Anterior  Scaling and  maceration between toes and over lateral edges of soles.    Left Upper Back  Fine, ashy, pale to white scale diffusely over skin.         Assessment/Plan   Actinic keratosis (7)  Left Temple (6); Left Forehead    Actinic keratosis (AK)- The premalignant nature of the disorder was reviewed and treatment options were reviewed.   - Patient agreeable to treatment with cryotherapy today.  Sites confirmed. Risks and benefits reviewed including but not limited to pain, redness, swelling, blister, scab, healing with hypo or hyperpigmentation, and scar. Chance of recurrence or persistence reviewed.      Destr of lesion - Left Forehead, Left Temple  Complexity: simple    Destruction method: cryotherapy    Informed consent: discussed and consent obtained    Lesion destroyed using liquid nitrogen: Yes    Cryotherapy cycles:  2  Outcome: patient tolerated procedure well with no complications    Post-procedure details: wound care instructions given      Multiple benign melanocytic nevi    Benign melanocytic nevi  - Discussed benign nature and that no treatment is necessary unless it becomes painful or increases in size. Patient opts for clinical monitoring at this time.    - Sun protective behavior reviewed and encouraged including the use of over-the-counter sunscreen with SPF30+ daily (reapply every 1.5 hours when outdoors), UPF clothing, broad rimmed hats, sunglasses, and avoidance of midday sun. Home skin monitoring encouraged and how to monitor for skin cancer (changing or new moles, new rapidly growing or non-healing lesions) reviewed. Patient encouraged to call with interval concerns or changes.      Seborrheic keratosis    Benign nature reviewed, patient opts to monitor.    Hemangioma of skin    Benign nature reviewed, patient opts to monitor at this time.     Tinea pedis of right foot  Right Foot - Anterior    Tinea pedis of left foot  Left Foot - Anterior    Patient does well with OTC anitfungal treatment and will  continue to use this as needed.     Xerosis cutis with pruritus  Left Upper Back    - nature reviewed, and written recommendation given for OTC Sarna sensitive skin to use as needed.     Lentigo- actinically damaged skin    - Sun protective behavior reviewed and encouraged including the use of over-the-counter sunscreen with SPF30+ daily (reapply every 1.5 hours when outdoors), UPF clothing, broad rimmed hats, sunglasses, and avoidance of midday sun. Home skin monitoring encouraged and how to monitor for skin cancer (changing or new moles, new rapidly growing or non-healing lesions) reviewed. Patient encouraged to call with interval concerns or changes.            Provider Attestation - Scribe documentation    All medical record entries made by the Scribe were at my direction and personally dictated by me. I have reviewed the chart and agree that the record accurately reflects my personal performance of the history, physical exam, discussion and plan.    Cindy Fontana MD

## 2024-06-01 DIAGNOSIS — K22.2 SCHATZKI'S RING: ICD-10-CM

## 2024-06-01 DIAGNOSIS — E78.5 DYSLIPIDEMIA: ICD-10-CM

## 2024-06-01 DIAGNOSIS — M50.90 CERVICAL DISC DISEASE: ICD-10-CM

## 2024-06-03 RX ORDER — PANTOPRAZOLE SODIUM 40 MG/1
40 TABLET, DELAYED RELEASE ORAL
Qty: 90 TABLET | Refills: 3 | Status: SHIPPED | OUTPATIENT
Start: 2024-06-03

## 2024-06-03 RX ORDER — ROSUVASTATIN CALCIUM 10 MG/1
10 TABLET, COATED ORAL NIGHTLY
Qty: 90 TABLET | Refills: 3 | Status: SHIPPED | OUTPATIENT
Start: 2024-06-03

## 2024-06-03 RX ORDER — MELOXICAM 7.5 MG/1
7.5 TABLET ORAL DAILY
Qty: 90 TABLET | Refills: 3 | Status: SHIPPED | OUTPATIENT
Start: 2024-06-03

## 2024-07-29 ENCOUNTER — APPOINTMENT (OUTPATIENT)
Dept: PRIMARY CARE | Facility: CLINIC | Age: 79
End: 2024-07-29
Payer: MEDICARE

## 2024-07-29 ENCOUNTER — LAB (OUTPATIENT)
Dept: LAB | Facility: LAB | Age: 79
End: 2024-07-29
Payer: MEDICARE

## 2024-07-29 ENCOUNTER — HOSPITAL ENCOUNTER (OUTPATIENT)
Dept: RADIOLOGY | Facility: CLINIC | Age: 79
Discharge: HOME | End: 2024-07-29
Payer: MEDICARE

## 2024-07-29 VITALS
BODY MASS INDEX: 33.82 KG/M2 | HEIGHT: 65 IN | DIASTOLIC BLOOD PRESSURE: 80 MMHG | OXYGEN SATURATION: 97 % | WEIGHT: 203 LBS | SYSTOLIC BLOOD PRESSURE: 130 MMHG | RESPIRATION RATE: 14 BRPM | HEART RATE: 59 BPM

## 2024-07-29 DIAGNOSIS — E53.8 VITAMIN B 12 DEFICIENCY: ICD-10-CM

## 2024-07-29 DIAGNOSIS — Z00.00 MEDICARE ANNUAL WELLNESS VISIT, SUBSEQUENT: Primary | ICD-10-CM

## 2024-07-29 DIAGNOSIS — R35.1 NOCTURIA: ICD-10-CM

## 2024-07-29 DIAGNOSIS — N48.9 DISEASE OF PENIS: ICD-10-CM

## 2024-07-29 DIAGNOSIS — E78.5 DYSLIPIDEMIA: ICD-10-CM

## 2024-07-29 DIAGNOSIS — E55.9 MILD VITAMIN D DEFICIENCY: ICD-10-CM

## 2024-07-29 DIAGNOSIS — M54.50 CHRONIC LOW BACK PAIN, UNSPECIFIED BACK PAIN LATERALITY, UNSPECIFIED WHETHER SCIATICA PRESENT: ICD-10-CM

## 2024-07-29 DIAGNOSIS — G89.29 CHRONIC LOW BACK PAIN, UNSPECIFIED BACK PAIN LATERALITY, UNSPECIFIED WHETHER SCIATICA PRESENT: ICD-10-CM

## 2024-07-29 LAB
25(OH)D3 SERPL-MCNC: 61 NG/ML (ref 30–100)
ALBUMIN SERPL BCP-MCNC: 4.3 G/DL (ref 3.4–5)
ALP SERPL-CCNC: 84 U/L (ref 33–136)
ALT SERPL W P-5'-P-CCNC: 19 U/L (ref 10–52)
ANION GAP SERPL CALC-SCNC: 13 MMOL/L (ref 10–20)
AST SERPL W P-5'-P-CCNC: 21 U/L (ref 9–39)
BILIRUB SERPL-MCNC: 0.9 MG/DL (ref 0–1.2)
BUN SERPL-MCNC: 27 MG/DL (ref 6–23)
CALCIUM SERPL-MCNC: 9.1 MG/DL (ref 8.6–10.3)
CHLORIDE SERPL-SCNC: 108 MMOL/L (ref 98–107)
CHOLEST SERPL-MCNC: 112 MG/DL (ref 0–199)
CHOLESTEROL/HDL RATIO: 2.3
CO2 SERPL-SCNC: 23 MMOL/L (ref 21–32)
CREAT SERPL-MCNC: 0.9 MG/DL (ref 0.5–1.3)
EGFRCR SERPLBLD CKD-EPI 2021: 87 ML/MIN/1.73M*2
ERYTHROCYTE [DISTWIDTH] IN BLOOD BY AUTOMATED COUNT: 12.9 % (ref 11.5–14.5)
GLUCOSE SERPL-MCNC: 98 MG/DL (ref 74–99)
HCT VFR BLD AUTO: 44.4 % (ref 41–52)
HDLC SERPL-MCNC: 49.5 MG/DL
HGB BLD-MCNC: 14.6 G/DL (ref 13.5–17.5)
LDLC SERPL CALC-MCNC: 33 MG/DL
MCH RBC QN AUTO: 30.7 PG (ref 26–34)
MCHC RBC AUTO-ENTMCNC: 32.9 G/DL (ref 32–36)
MCV RBC AUTO: 93 FL (ref 80–100)
NON HDL CHOLESTEROL: 63 MG/DL (ref 0–149)
NRBC BLD-RTO: 0 /100 WBCS (ref 0–0)
PLATELET # BLD AUTO: 200 X10*3/UL (ref 150–450)
POTASSIUM SERPL-SCNC: 4.1 MMOL/L (ref 3.5–5.3)
PROT SERPL-MCNC: 6.1 G/DL (ref 6.4–8.2)
PSA SERPL-MCNC: 2.23 NG/ML
RBC # BLD AUTO: 4.76 X10*6/UL (ref 4.5–5.9)
SODIUM SERPL-SCNC: 140 MMOL/L (ref 136–145)
TRIGL SERPL-MCNC: 146 MG/DL (ref 0–149)
TSH SERPL-ACNC: 1.66 MIU/L (ref 0.44–3.98)
VIT B12 SERPL-MCNC: 333 PG/ML (ref 211–911)
VLDL: 29 MG/DL (ref 0–40)
WBC # BLD AUTO: 6.8 X10*3/UL (ref 4.4–11.3)

## 2024-07-29 PROCEDURE — 84153 ASSAY OF PSA TOTAL: CPT

## 2024-07-29 PROCEDURE — 80061 LIPID PANEL: CPT

## 2024-07-29 PROCEDURE — 82607 VITAMIN B-12: CPT

## 2024-07-29 PROCEDURE — 3075F SYST BP GE 130 - 139MM HG: CPT | Performed by: INTERNAL MEDICINE

## 2024-07-29 PROCEDURE — 36415 COLL VENOUS BLD VENIPUNCTURE: CPT

## 2024-07-29 PROCEDURE — 72110 X-RAY EXAM L-2 SPINE 4/>VWS: CPT

## 2024-07-29 PROCEDURE — 3079F DIAST BP 80-89 MM HG: CPT | Performed by: INTERNAL MEDICINE

## 2024-07-29 PROCEDURE — 1170F FXNL STATUS ASSESSED: CPT | Performed by: INTERNAL MEDICINE

## 2024-07-29 PROCEDURE — 85027 COMPLETE CBC AUTOMATED: CPT

## 2024-07-29 PROCEDURE — G0439 PPPS, SUBSEQ VISIT: HCPCS | Performed by: INTERNAL MEDICINE

## 2024-07-29 PROCEDURE — 72110 X-RAY EXAM L-2 SPINE 4/>VWS: CPT | Performed by: RADIOLOGY

## 2024-07-29 PROCEDURE — 82306 VITAMIN D 25 HYDROXY: CPT

## 2024-07-29 PROCEDURE — 80053 COMPREHEN METABOLIC PANEL: CPT

## 2024-07-29 PROCEDURE — 99213 OFFICE O/P EST LOW 20 MIN: CPT | Performed by: INTERNAL MEDICINE

## 2024-07-29 PROCEDURE — 84443 ASSAY THYROID STIM HORMONE: CPT

## 2024-07-29 PROCEDURE — 1124F ACP DISCUSS-NO DSCNMKR DOCD: CPT | Performed by: INTERNAL MEDICINE

## 2024-07-29 RX ORDER — ROSUVASTATIN CALCIUM 10 MG/1
10 TABLET, COATED ORAL NIGHTLY
Qty: 90 TABLET | Refills: 3 | Status: SHIPPED | OUTPATIENT
Start: 2024-07-29

## 2024-07-29 ASSESSMENT — ENCOUNTER SYMPTOMS
COUGH: 0
JOINT SWELLING: 0
SHORTNESS OF BREATH: 0
VOMITING: 0
MYALGIAS: 0
DIARRHEA: 0
NAUSEA: 0
CONSTIPATION: 0
CHILLS: 0
FEVER: 0
DIAPHORESIS: 0
BACK PAIN: 1

## 2024-07-29 ASSESSMENT — ACTIVITIES OF DAILY LIVING (ADL)
MANAGING_FINANCES: INDEPENDENT
DOING_HOUSEWORK: INDEPENDENT
DRESSING: INDEPENDENT
GROCERY_SHOPPING: INDEPENDENT
BATHING: INDEPENDENT
TAKING_MEDICATION: INDEPENDENT

## 2024-07-29 ASSESSMENT — PATIENT HEALTH QUESTIONNAIRE - PHQ9
SUM OF ALL RESPONSES TO PHQ9 QUESTIONS 1 AND 2: 0
2. FEELING DOWN, DEPRESSED OR HOPELESS: NOT AT ALL
SUM OF ALL RESPONSES TO PHQ9 QUESTIONS 1 AND 2: 0
1. LITTLE INTEREST OR PLEASURE IN DOING THINGS: NOT AT ALL
1. LITTLE INTEREST OR PLEASURE IN DOING THINGS: NOT AT ALL
2. FEELING DOWN, DEPRESSED OR HOPELESS: NOT AT ALL
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
## 2024-07-29 NOTE — PATIENT INSTRUCTIONS
FASTING LABS ARE ORDERED FOR YOU    2.  XRAY LOW BACK TO MAKE SURE NO INTERVAL INJURY TO YOUR BACK    3.  IF NO SIGNIFICANT XRAY FINDINGS, THEN PHYSICAL THERAPY TUNE UP WOULD BE NEXT RECOMMENDATION    4.  UROLOGIST REFERRAL MADE FOR DISAPPEARING PENIS SYNDROME    5.  YOU ARE OTHERWISE CAUGHT UP FROM A MEDICARE STANDPOINT FOR HEALTH SCREENING    6.  FOLLOW UP YEARLY OR AS NEEDED FOR BACK

## 2024-07-31 DIAGNOSIS — M47.26 OSTEOARTHRITIS OF SPINE WITH RADICULOPATHY, LUMBAR REGION: Primary | ICD-10-CM

## 2024-08-07 ENCOUNTER — EVALUATION (OUTPATIENT)
Dept: PHYSICAL THERAPY | Facility: CLINIC | Age: 79
End: 2024-08-07
Payer: MEDICARE

## 2024-08-07 DIAGNOSIS — M47.26 OSTEOARTHRITIS OF SPINE WITH RADICULOPATHY, LUMBAR REGION: Primary | ICD-10-CM

## 2024-08-07 PROCEDURE — 97161 PT EVAL LOW COMPLEX 20 MIN: CPT | Mod: GP | Performed by: PHYSICAL THERAPIST

## 2024-08-07 PROCEDURE — 97110 THERAPEUTIC EXERCISES: CPT | Mod: GP | Performed by: PHYSICAL THERAPIST

## 2024-08-07 ASSESSMENT — PAIN DESCRIPTION - DESCRIPTORS: DESCRIPTORS: SORE;ACHING

## 2024-08-07 ASSESSMENT — PAIN SCALES - GENERAL: PAINLEVEL_OUTOF10: 2

## 2024-08-07 ASSESSMENT — PAIN - FUNCTIONAL ASSESSMENT: PAIN_FUNCTIONAL_ASSESSMENT: 0-10

## 2024-08-07 NOTE — PROGRESS NOTES
Physical Therapy    Physical Therapy Evaluation and Treatment      Patient Name: Collins Acevedo  MRN: [id number]  Today's Date: 8/7/2024    Time Entry:   Time Calculation  Start Time: 1605  Stop Time: 1645  Time Calculation (min): 40 min  PT Evaluation Time Entry  PT Evaluation (Low) Time Entry: 26  PT Therapeutic Procedures Time Entry  Therapeutic Exercise Time Entry: 14                   Insurance:  [facility] Medicare  $30 copay; 0% coinsurance  PA not req  Visit: 1  POC: 7      Assessment:  79 y/o M presents to outpatient physical therapy with reports of low back pain d/t degenerative changes within the spine. The patient presents with the current impairments of pain, decreased ROM, weakness, decreased stability, and increased muscular tension. These impairments currently limit their ability to stand for extended periods of time, ambulate, sit for extended periods of time, bend, and lift objects. Due to the limitations listed above, the patient is currently at a decreased functional level compared to baseline, and they would benefit from skilled physical therapy to improve pain intensity, strength, mechanics, stability, flexibility, mobility, and facilitate a safe and efficient return to functional baseline. Patient's prognosis for improvement with therapy is good at this time.  PT Assessment  PT Assessment Results: Decreased strength, Decreased range of motion, Pain  Rehab Prognosis: Good  Evaluation/Treatment Tolerance: Patient tolerated treatment well     Plan:  OP PT Plan  Treatment/Interventions: Cryotherapy, Dry needling, Education/ Instruction, Electrical stimulation, Hot pack, Manual therapy, Neuromuscular re-education, Self care/ home management, Taping techniques, Therapeutic activities, Therapeutic exercises, Ultrasound, Vasopneumatic device  PT Plan: Skilled PT  PT Frequency: 1 time per week  Duration: 6 additional visits  Onset Date: 07/31/24  Certification Period Start Date: 08/07/24  Certification Period  "End Date: 11/05/24  Rehab Potential: Good  Plan of Care Agreement: Patient    Complexity:  Low    Current Problem:   1. Osteoarthritis of spine with radiculopathy, lumbar region  Referral to Physical Therapy    Follow Up In Physical Therapy          Subjective    Patient reports to OPPT with complaints of low back pain for the last few years. Pain has changed over the years. He notes that the pain is right across the low back about equal on both sides. He does note that he has a particularly tender area on the left side. He does have a history of radiating pain into the lateral hip, but he has been able to manage that mostly with exercises. He is ambulatory mostly with a SPC, but can walk without it. Feels that he needs to \"relieve the pressure\" in the low back by supporting it when he is walking/standing for too long.  Increased pain with walking, sitting for too long (>10 minutes), bending forward, picking something up, and lifting heavy items. Pain at this time is a 2/10, 6/10 at worst in the last few weeks, 0/10 at best. Denies numbness and tingling from the back.   General:  General  Reason for Referral: Back pain  Referred By: Dr. 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  Precautions:  Precautions  Precautions Comment: High chloresterol  Pain:  Pain Assessment  Pain Assessment: 0-10  0-10 (Numeric) Pain Score: 2  Pain Type: Chronic pain  Pain Location: Back  Pain Orientation: Lower  Pain Descriptors: Sore, Aching  Home Living:  Home Living  Home Living Comment: [place] home with one VINOD  Prior Level of Function:  Prior Function Per Pt/Caregiver Report  Prior Function Comments: Independent in all ADLs and desired activities    Objective   Lumbar AROM:  Flexion to ankles*  Extension WFL  RSB to distal thigh  LSB to mid thigh*    LE Strength:  Hip flexion R 4+/5, L 4/5  Hip abd R 4-/5, L 4-/5  Hip ext R 4-/5*, L 4-/5  Knee flexion R 4+/5, L 4+/5  Knee extension R 4+/5, L " 4+/5    Core strength (DLLT):   ~70 deg   (Poor)    HS length (90/90):  R -34  L -43    Response to repeated movements: not assessed due to lack of radicular symptoms at this time    Gait: increased lateral sway over the right lower extremity during stance phase, amb with SPC in the RUE, decreased gait speed, decreased elio  Dermatomes: intact  Palpation: TTP to the left PSIS, base of the sacrum and left lateral border of the sacrum  Special tests: + slump on L, + thigh thrust on R, - compression, - gapping, - sacral thrust    denies saddle paraesthesias and changes in bowel/bladder function    XR lumbar spine 7/29/24: 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 There is severe disc space narrowing  from the L1-2 through the L5-S1 levels with considerable sclerosis  and osteophytosis of the endplates at L2-3 and L5-S1. A grade 1  spondylolisthesis of L5 on S1 is present measuring 3-4 mm. There is  narrowing of the apophyseal joints from L3-4 through the L5-S1 levels  with osteophytosis.      Outcome Measures:  Other Measures  Oswestry Disablity Index (TEGAN): 19; 38%     Treatments:  Therapeutic exercises:  Ppt x10  Ppt with march x10 B  Clamshells RTB x10 B  Paloff press GTT x10  Lumbar stretch at counter x10  (14')    EDUCATION:  Outpatient Education  Individual(s) Educated: Patient  Education Provided: Body Mechanics, Anatomy, Home Exercise Program, Physiology, POC  Risk and Benefits Discussed with Patient/Caregiver/Other: yes  Patient/Caregiver Demonstrated Understanding: yes  Plan of Care Discussed and Agreed Upon: yes  Patient Response to Education: Patient/Caregiver Verbalized Understanding of Information, Patient/Caregiver Performed Return Demonstration of Exercises/Activities, Patient/Caregiver Asked Appropriate Questions  HEP:  Exercises  - Standing Lumbar Spine Flexion Stretch Counter  - 2 x daily - 7 x weekly - 1 sets - 10 reps - 5 sec hold  -  "Supine Posterior Pelvic Tilt  - 1 x daily - 7 x weekly - 2 sets - 10 reps - 5 sec hold  - Supine March with Posterior Pelvic Tilt  - 1 x daily - 7 x weekly - 2 sets - 10 reps  - Standing Anti-Rotation Press with Anchored Resistance  - 1 x daily - 7 x weekly - 2 sets - 10 reps - 5 sec hold  - Clamshell with Resistance  - 1 x daily - 7 x weekly - 2 sets - 10 reps  Goals:  By discharge:  1. Patient will report and demonstrate independence with established HEP  2. Patient will demonstrate ability to bend and lift 10lb from the floor to waist height 10x consecutively and without an increase in low back pain  3. Patient will increase gross hip and knee strength to >/= 4+/5 to improve their ability to stand, ambulate, lift, and perform all work duties without restrictions  4. Patient will report pain of 0/10 at rest, and no greater than 2/10 with any activity including bending, lifting, sitting for extended periods of time, and walking  5. Patient will demonstrate a decrease in Modified Oswestry Low Back Disability Index score by 10 pts to 9/50 to meet established MCID for the outcome measure (baseline 8/7/24 19/50)  6. Patient will demonstrate pain free and symmetrical AROM of the lumbar spine to improve his ability to bend, lift, dress, and perform all daily activities without restrictions  7. Patient will demonstrate \"average\" core strength or better shown by a double leg lower test score of </= 45 deg to provide the lower trunk increased stability throughout daily tasks and functions  8. Patient will demonstrate the ability to carry a 25 lb box 40' x2 without pain in the low back exceeding 2/10 to indicate an improved ability to perform light/moderate lifting activities within the home             "

## 2024-08-14 ENCOUNTER — TREATMENT (OUTPATIENT)
Dept: PHYSICAL THERAPY | Facility: CLINIC | Age: 79
End: 2024-08-14
Payer: MEDICARE

## 2024-08-14 DIAGNOSIS — M47.26 OSTEOARTHRITIS OF SPINE WITH RADICULOPATHY, LUMBAR REGION: ICD-10-CM

## 2024-08-14 PROCEDURE — 97110 THERAPEUTIC EXERCISES: CPT | Mod: GP,CQ

## 2024-08-14 ASSESSMENT — PAIN SCALES - GENERAL: PAINLEVEL_OUTOF10: 0 - NO PAIN

## 2024-08-14 ASSESSMENT — PAIN - FUNCTIONAL ASSESSMENT: PAIN_FUNCTIONAL_ASSESSMENT: 0-10

## 2024-08-14 NOTE — PROGRESS NOTES
"Physical Therapy    Physical Therapy Treatment    Patient Name: Collins Acevedo  MRN: [id number]  Today's Date: 8/14/2024    Time Entry:   Time Calculation  Start Time: 1021  Stop Time: 1101  Time Calculation (min): 40 min     PT Therapeutic Procedures Time Entry  Therapeutic Exercise Time Entry: 39Insurance:  [facility] Medicare  $30 copay; 0% coinsurance  PA not req  Visit: 2  POC: 7             Assessment: Decreased malalignment/ muscle tightness noted after manual. Pt eduction to correct PPT. Pt just notes some increased tightness in L hip after tx but reports 0/10 pain in LB still. Patient would benefit from P.T. to continue to address impairments in order to improve strength, flexibility, posture, and  to decrease symptoms and increase overall function.      PT Assessment  Evaluation/Treatment Tolerance: Patient tolerated treatment well  Plan:  OP PT Plan  Treatment/Interventions: Cryotherapy, Dry needling, Education/ Instruction, Electrical stimulation, Hot pack, Manual therapy, Neuromuscular re-education, Self care/ home management, Taping techniques, Therapeutic activities, Therapeutic exercises, Ultrasound, Vasopneumatic device  PT Plan: Skilled PT  PT Frequency: 1 time per week  Duration: 6 additional visits  Onset Date: 07/31/24  Certification Period Start Date: 08/07/24  Certification Period End Date: 11/05/24  Rehab Potential: Good  Plan of Care Agreement: Patient    Current Problem  1. Osteoarthritis of spine with radiculopathy, lumbar region  Follow Up In Physical Therapy          General     General  Reason for Referral: Back pain  Referred By: Dr. 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 Notes L knee \"mccann all of the " "time.\"    Precautions  Precautions  Precautions Comment: High chloresterol     Pain  Pain Assessment  Pain Assessment: 0-10  0-10 (Numeric) Pain Score: 0 - No pain  Pain Type: Chronic pain  Pain Location: Back  Pain Orientation: Lower    Objective   L long-short    Pt correction on PPT HEP, otherwise proper biomechanics.    Pt education on L knee scar massage to allow L quad to contract correctly.      Treatments:  Therapeutic exercises:  Ppt 5\"x10  SK2C 3x30\" B  Ppt with march x10 B  Ppt with KO at 45 deg x10 B  L hamstring stretch 5x30\"  R hip flexor stretch nv  Clamshells GTB 2x15 B  Paloff press GTT x10 NT  Lumbar stretch at counter x10  Includes MET to pelvis decrease rotation and L knee scar massage to allow L quad to contract correctly, thus, decreasing R hip and LB compensation possibly. ~7' total for manual.       EDUCATION:  Outpatient Education  Individual(s) Educated: Patient  Education Provided: Body Mechanics, Anatomy, Home Exercise Program, Physiology, POC  Risk and Benefits Discussed with Patient/Caregiver/Other: yes  Patient/Caregiver Demonstrated Understanding: yes  Plan of Care Discussed and Agreed Upon: yes  Patient Response to Education: Patient/Caregiver Verbalized Understanding of Information, Patient/Caregiver Performed Return Demonstration of Exercises/Activities, Patient/Caregiver Asked Appropriate Questions  HEP:  Exercises  - Standing Lumbar Spine Flexion Stretch Counter  - 2 x daily - 7 x weekly - 1 sets - 10 reps - 5 sec hold  - Supine Posterior Pelvic Tilt  - 1 x daily - 7 x weekly - 2 sets - 10 reps - 5 sec hold  - Supine March with Posterior Pelvic Tilt  - 1 x daily - 7 x weekly - 2 sets - 10 reps  - Standing Anti-Rotation Press with Anchored Resistance  - 1 x daily - 7 x weekly - 2 sets - 10 reps - 5 sec hold  - Clamshell with Resistance  - 1 x daily - 7 x weekly - 2 sets - 10 reps    Goals:  By discharge:  1. Patient will report and demonstrate independence with established HEP  2. " "Patient will demonstrate ability to bend and lift 10lb from the floor to waist height 10x consecutively and without an increase in low back pain  3. Patient will increase gross hip and knee strength to >/= 4+/5 to improve their ability to stand, ambulate, lift, and perform all work duties without restrictions  4. Patient will report pain of 0/10 at rest, and no greater than 2/10 with any activity including bending, lifting, sitting for extended periods of time, and walking  5. Patient will demonstrate a decrease in Modified Oswestry Low Back Disability Index score by 10 pts to 9/50 to meet established MCID for the outcome measure (baseline 8/7/24 19/50)  6. Patient will demonstrate pain free and symmetrical AROM of the lumbar spine to improve his ability to bend, lift, dress, and perform all daily activities without restrictions  7. Patient will demonstrate \"average\" core strength or better shown by a double leg lower test score of </= 45 deg to provide the lower trunk increased stability throughout daily tasks and functions  8. Patient will demonstrate the ability to carry a 25 lb box 40' x2 without pain in the low back exceeding 2/10 to indicate an improved ability to perform light/moderate lifting activities within the home   "

## 2024-08-21 ENCOUNTER — TREATMENT (OUTPATIENT)
Dept: PHYSICAL THERAPY | Facility: CLINIC | Age: 79
End: 2024-08-21
Payer: MEDICARE

## 2024-08-21 DIAGNOSIS — M47.26 OSTEOARTHRITIS OF SPINE WITH RADICULOPATHY, LUMBAR REGION: Primary | ICD-10-CM

## 2024-08-21 PROCEDURE — 97110 THERAPEUTIC EXERCISES: CPT | Mod: GP,CQ

## 2024-08-21 ASSESSMENT — PAIN SCALES - GENERAL: PAINLEVEL_OUTOF10: 0 - NO PAIN

## 2024-08-21 ASSESSMENT — PAIN - FUNCTIONAL ASSESSMENT: PAIN_FUNCTIONAL_ASSESSMENT: 0-10

## 2024-08-21 NOTE — PROGRESS NOTES
"Physical Therapy    Physical Therapy Treatment    Patient Name: Collins Acevedo  MRN: [id number]  Today's Date: 8/21/2024    Time Entry:   Time Calculation  Start Time: 1017  Stop Time: 1104  Time Calculation (min): 47 min     PT Therapeutic Procedures Time Entry  Therapeutic Exercise Time Entry: 47    Insurance:  [facility] Medicare  $30 copay; 0% coinsurance  PA not req  Visit: 3  POC: 7             Assessment: Pt had some discomfort in low back following standing exercises. He performed SKTC exercise for lumbar flexion at end of session and this resolved low back discomfort. He had no pain in low back at end of session. Patient would benefit from P.T. to continue to address impairments in order to improve strength, flexibility, posture, and body mechanics to decrease symptoms and increase overall function.         Plan:  Continue to progress core strength as frank. Monitor pelvis  OP PT Plan  PT Plan: Skilled PT    Current Problem  1. Osteoarthritis of spine with radiculopathy, lumbar region  Follow Up In Physical Therapy            General     General  Reason for Referral: Back pain  Referred By: Dr. 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 Minimal stiffness only per patient. He states that he has been performing HEP and feels that the exercises are helping.   Precautions  Precautions  Precautions Comment: High chloresterol     Pain  Pain Assessment  Pain Assessment: 0-10  0-10 (Numeric) Pain Score: 0 - No pain  Pain Type: Chronic pain  Pain Location: Back  Pain Orientation: Lower    Objective   Level pelvis upon entrance         Treatments:  Therapeutic exercises:  Ppt 5\"x10  SK2C 3x30\" B  Ppt with march x10 B  Ppt with KO at 45 deg x10 B  Piriformis stretch 30 sec x 3 B  LTR x 10 B   hamstring stretch 3x30\" B  R hip flexor stretch nv  Clamshells GTB 2x15 B  Paloff press GTT 2 x10  LAE with abdominal brace GTT 2 x " 10  Alt UE extension with abdominal brace GTT x10   Lumbar stretch at counter x10 NT        EDUCATION:  8/21/24: Pt provided with written, illustrated instructions of LTR, piriformis stretch and hamstring stretch for HEP   Outpatient Education  Individual(s) Educated: Patient  Education Provided: Body Mechanics, Anatomy, Home Exercise Program, Physiology, POC  Risk and Benefits Discussed with Patient/Caregiver/Other: yes  Patient/Caregiver Demonstrated Understanding: yes  Plan of Care Discussed and Agreed Upon: yes  Patient Response to Education: Patient/Caregiver Verbalized Understanding of Information, Patient/Caregiver Performed Return Demonstration of Exercises/Activities, Patient/Caregiver Asked Appropriate Questions  HEP:  Exercises  - Standing Lumbar Spine Flexion Stretch Counter  - 2 x daily - 7 x weekly - 1 sets - 10 reps - 5 sec hold  - Supine Posterior Pelvic Tilt  - 1 x daily - 7 x weekly - 2 sets - 10 reps - 5 sec hold  - Supine March with Posterior Pelvic Tilt  - 1 x daily - 7 x weekly - 2 sets - 10 reps  - Standing Anti-Rotation Press with Anchored Resistance  - 1 x daily - 7 x weekly - 2 sets - 10 reps - 5 sec hold  - Clamshell with Resistance  - 1 x daily - 7 x weekly - 2 sets - 10 reps    Goals:  By discharge:  1. Patient will report and demonstrate independence with established HEP  2. Patient will demonstrate ability to bend and lift 10lb from the floor to waist height 10x consecutively and without an increase in low back pain  3. Patient will increase gross hip and knee strength to >/= 4+/5 to improve their ability to stand, ambulate, lift, and perform all work duties without restrictions  4. Patient will report pain of 0/10 at rest, and no greater than 2/10 with any activity including bending, lifting, sitting for extended periods of time, and walking  5. Patient will demonstrate a decrease in Modified Oswestry Low Back Disability Index score by 10 pts to 9/50 to meet established MCID for the  "outcome measure (baseline 8/7/24 19/50)  6. Patient will demonstrate pain free and symmetrical AROM of the lumbar spine to improve his ability to bend, lift, dress, and perform all daily activities without restrictions  7. Patient will demonstrate \"average\" core strength or better shown by a double leg lower test score of </= 45 deg to provide the lower trunk increased stability throughout daily tasks and functions  8. Patient will demonstrate the ability to carry a 25 lb box 40' x2 without pain in the low back exceeding 2/10 to indicate an improved ability to perform light/moderate lifting activities within the home   "

## 2024-08-28 ENCOUNTER — TREATMENT (OUTPATIENT)
Dept: PHYSICAL THERAPY | Facility: CLINIC | Age: 79
End: 2024-08-28
Payer: MEDICARE

## 2024-08-28 DIAGNOSIS — M47.26 OSTEOARTHRITIS OF SPINE WITH RADICULOPATHY, LUMBAR REGION: Primary | ICD-10-CM

## 2024-08-28 PROCEDURE — 97110 THERAPEUTIC EXERCISES: CPT | Mod: GP | Performed by: PHYSICAL THERAPIST

## 2024-08-28 ASSESSMENT — PAIN SCALES - GENERAL: PAINLEVEL_OUTOF10: 0 - NO PAIN

## 2024-08-28 ASSESSMENT — PAIN - FUNCTIONAL ASSESSMENT: PAIN_FUNCTIONAL_ASSESSMENT: 0-10

## 2024-08-28 NOTE — PROGRESS NOTES
Physical Therapy    Physical Therapy Treatment    Patient Name: Collins Acevedo  MRN: [id number]  Today's Date: 8/28/2024    Time Entry:   Time Calculation  Start Time: 0933  Stop Time: 1015  Time Calculation (min): 42 min     PT Therapeutic Procedures Time Entry  Therapeutic Exercise Time Entry: 42           Insurance:  [facility] Medicare  $30 copay; 0% coinsurance  PA not req  Visit: 4  POC: 7    Assessment:   Patient reports pain intensity of 0/10 at end of session, but increased muscular fatigue was reported as expected for given acetifies. PT session focuses on therapeutic exercises targeting gross strengthening of the lower extremities, low back, and core. Patient is challenged with new activities this date, particularly with new core strengthening. PT was planning on updating HEP with addition of paloff press, but patient reports already performing that exercise at home. He remains below his baseline function and would benefit from additional skilled PT.     Plan:  Continue to progress core strength as frank. Monitor pelvis  OP PT Plan  PT Plan: Skilled PT    Current Problem  1. Osteoarthritis of spine with radiculopathy, lumbar region  Follow Up In Physical Therapy              General  PT  Visit  Response to Previous Treatment: Patient with no complaints from previous session., Compliant with home exercise program  General  Reason for Referral: Back pain  Referred By: Dr. 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 Denies pain at this time, but notes improvement in both frequency as well as intensity of pain with activity. Exercises have been going well.   Precautions  Precautions  Precautions Comment: High chloresterol     Pain  Pain Assessment  Pain Assessment: 0-10  0-10 (Numeric) Pain Score: 0 - No pain  Pain Type: Chronic pain  Pain Location: Back  Pain Orientation: Lower    Objective   Level pelvis upon  "entrance         Treatments:  Therapeutic exercises:  Ppt 5\"x15  SK2C 3x30\" B  Ppt with march x10 B  Ppt with KO at 45 deg x10 B NT  DLS march L2 x15  DLS march L2 with KO x15 B  Piriformis stretch 30 sec x 3 B NT  LTR x 10 B   hamstring stretch 3x30\" B  R hip flexor stretch nv  Clamshells GTB 2x15 B  Paloff press BTT 2 x10  Prone hip ext (knee flex/ext) x12 ea B  LAE with abdominal brace BTT 2 x 10  Alt UE extension with abdominal brace GTT x10   Lumbar stretch at counter x10 NT  Seated on swiss ball stir the pot 6# x15 CW/CCW      EDUCATION:  8/7/24:  Exercises  - Standing Lumbar Spine Flexion Stretch Counter  - 2 x daily - 7 x weekly - 1 sets - 10 reps - 5 sec hold  - Supine Posterior Pelvic Tilt  - 1 x daily - 7 x weekly - 2 sets - 10 reps - 5 sec hold  - Supine March with Posterior Pelvic Tilt  - 1 x daily - 7 x weekly - 2 sets - 10 reps  - Standing Anti-Rotation Press with Anchored Resistance  - 1 x daily - 7 x weekly - 2 sets - 10 reps - 5 sec hold  - Clamshell with Resistance  - 1 x daily - 7 x weekly - 2 sets - 10 reps  8/21/24: Pt provided with written, illustrated instructions of LTR, piriformis stretch and hamstring stretch for HEP       Goals:  By discharge:  1. Patient will report and demonstrate independence with established HEP  2. Patient will demonstrate ability to bend and lift 10lb from the floor to waist height 10x consecutively and without an increase in low back pain  3. Patient will increase gross hip and knee strength to >/= 4+/5 to improve their ability to stand, ambulate, lift, and perform all work duties without restrictions  4. Patient will report pain of 0/10 at rest, and no greater than 2/10 with any activity including bending, lifting, sitting for extended periods of time, and walking  5. Patient will demonstrate a decrease in Modified Oswestry Low Back Disability Index score by 10 pts to 9/50 to meet established MCID for the outcome measure (baseline 8/7/24 19/50)  6. Patient will " "demonstrate pain free and symmetrical AROM of the lumbar spine to improve his ability to bend, lift, dress, and perform all daily activities without restrictions  7. Patient will demonstrate \"average\" core strength or better shown by a double leg lower test score of </= 45 deg to provide the lower trunk increased stability throughout daily tasks and functions  8. Patient will demonstrate the ability to carry a 25 lb box 40' x2 without pain in the low back exceeding 2/10 to indicate an improved ability to perform light/moderate lifting activities within the home   "

## 2024-09-04 ENCOUNTER — APPOINTMENT (OUTPATIENT)
Dept: PHYSICAL THERAPY | Facility: CLINIC | Age: 79
End: 2024-09-04
Payer: MEDICARE

## 2024-09-05 ENCOUNTER — TREATMENT (OUTPATIENT)
Dept: PHYSICAL THERAPY | Facility: CLINIC | Age: 79
End: 2024-09-05
Payer: MEDICARE

## 2024-09-05 DIAGNOSIS — M47.26 OSTEOARTHRITIS OF SPINE WITH RADICULOPATHY, LUMBAR REGION: Primary | ICD-10-CM

## 2024-09-05 PROCEDURE — 97110 THERAPEUTIC EXERCISES: CPT | Mod: GP | Performed by: PHYSICAL THERAPIST

## 2024-09-05 ASSESSMENT — PAIN - FUNCTIONAL ASSESSMENT: PAIN_FUNCTIONAL_ASSESSMENT: 0-10

## 2024-09-05 ASSESSMENT — PAIN SCALES - GENERAL: PAINLEVEL_OUTOF10: 0 - NO PAIN

## 2024-09-05 NOTE — PROGRESS NOTES
Physical Therapy    Physical Therapy Discharge    Patient Name: Collins Acevedo  MRN: [id number]  Today's Date: 9/5/2024    Time Entry:   Time Calculation  Start Time: 0933  Stop Time: 1013  Time Calculation (min): 40 min     PT Therapeutic Procedures Time Entry  Therapeutic Exercise Time Entry: 39           Insurance:  [facility] Medicare  $30 copay; 0% coinsurance  PA not req  Visit: 5  POC: 7    Assessment:   Patient has completed 5 therapy visits up to this point, and have met 5/8 established therapy goals. The patient has progressed well, and has shown improvements in pain intensity, strength, flexibility, and mobility. At this time, the patient remains below functional baseline with intermittent pain in the back, and weakness of the lower extremities and core. Despite these continued deficits, patient requests early discharge to continue with his HEP on his own. PT anticipates continued progression in the remaining areas  with adherence to HEP. Patient is discharged from formal physical therapy at this time, with instructions to continue with HEP, and follow up with physician should their status change.  PT Assessment  Assessment Comment: discharge to HEP  Plan:  OP PT Plan  PT Plan: No Additional PT interventions required at this time    Current Problem  1. Osteoarthritis of spine with radiculopathy, lumbar region  Follow Up In Physical Therapy                General  PT  Visit  Response to Previous Treatment: Patient with no complaints from previous session., Compliant with home exercise program  General  Reason for Referral: Back pain  Referred By: Dr. 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    Subjective    Patient reports that the pain has been fairly well managed over the past few days. Denies pain at this time, 3/10 at worst in the last week or so. He believes the exercises have been helping. Despite still having issues, the patient feels good enough to discharge from PT  "and continue with his HEP on his own.   Precautions  Precautions  Precautions Comment: High chloresterol     Pain  Pain Assessment  Pain Assessment: 0-10  0-10 (Numeric) Pain Score: 0 - No pain  Pain Type: Chronic pain  Pain Location: Back  Pain Orientation: Lower    Objective   Lumbar AROM:  Flexion to ankles  Extension WFL  RSB to distal thigh  LSB to mid thigh*    LE Strength:  Hip flexion R 4+/5, L 4+/5  Hip abd R 4/5, L 4/5  Hip ext R 4/5, L 4/5  Knee flexion R 4+/5, L 4+/5  Knee extension R 4+/5, L 4+/5    Core strength (DLLT):   ~45  (average)    Lifting: pt is able to lift 10lb from floor height 10x consecutively and with pain 0/10     Carrying: pt is able to carry 25lb 40' x2 and with pain of 0/10, but patient reports increased tension in the low back    TEGAN: 7; 14%        Treatments:  Therapeutic exercises:  Obj measures and goals review  Ppt 5\"x15   SK2C 3x30\" B  Ppt with march x10 B NT  Ppt with KO at 45 deg x10 B NT  DLS march L2 x15  DLS march L2 with KO x15 B  Piriformis stretch 30 sec x 3 B   LTR x 10 B   hamstring stretch 3x30\" B  Clamshells GTB 2x15 B  Paloff press BTT x15 B  Alt UE extension with abdominal brace BTT x15      EDUCATION:  8/7/24:  Exercises  - Standing Lumbar Spine Flexion Stretch Counter  - 2 x daily - 7 x weekly - 1 sets - 10 reps - 5 sec hold  - Supine Posterior Pelvic Tilt  - 1 x daily - 7 x weekly - 2 sets - 10 reps - 5 sec hold  - Supine March with Posterior Pelvic Tilt  - 1 x daily - 7 x weekly - 2 sets - 10 reps  - Standing Anti-Rotation Press with Anchored Resistance  - 1 x daily - 7 x weekly - 2 sets - 10 reps - 5 sec hold  - Clamshell with Resistance  - 1 x daily - 7 x weekly - 2 sets - 10 reps  8/21/24: Pt provided with written, illustrated instructions of LTR, piriformis stretch and hamstring stretch for HEP   9/5/24:  Exercises  - Prone Hip Extension  - 1 x daily - 7 x weekly - 2 sets - 10 reps    Goals:  By discharge:  1. Patient will report and demonstrate " "independence with established HEP MET  2. Patient will demonstrate ability to bend and lift 10lb from the floor to waist height 10x consecutively and without an increase in low back pain MET  3. Patient will increase gross hip and knee strength to >/= 4+/5 to improve their ability to stand, ambulate, lift, and perform all work duties without restrictions PARTIALLY MET  4. Patient will report pain of 0/10 at rest, and no greater than 2/10 with any activity including bending, lifting, sitting for extended periods of time, and walking PARTIALLY MET  5. Patient will demonstrate a decrease in Modified Oswestry Low Back Disability Index score by 10 pts to 9/50 to meet established MCID for the outcome measure (baseline 8/7/24 19/50) MET  6. Patient will demonstrate pain free and symmetrical AROM of the lumbar spine to improve his ability to bend, lift, dress, and perform all daily activities without restrictions PARTIALLY MET  7. Patient will demonstrate \"average\" core strength or better shown by a double leg lower test score of </= 45 deg to provide the lower trunk increased stability throughout daily tasks and functions MET  8. Patient will demonstrate the ability to carry a 25 lb box 40' x2 without pain in the low back exceeding 2/10 to indicate an improved ability to perform light/moderate lifting activities within the home  MET  "

## 2024-09-18 ENCOUNTER — APPOINTMENT (OUTPATIENT)
Dept: UROLOGY | Facility: CLINIC | Age: 79
End: 2024-09-18
Payer: MEDICARE

## 2024-09-18 VITALS
BODY MASS INDEX: 34.11 KG/M2 | WEIGHT: 205 LBS | DIASTOLIC BLOOD PRESSURE: 78 MMHG | HEART RATE: 71 BPM | SYSTOLIC BLOOD PRESSURE: 119 MMHG | TEMPERATURE: 97.4 F

## 2024-09-18 DIAGNOSIS — N40.0 BENIGN PROSTATIC HYPERPLASIA, UNSPECIFIED WHETHER LOWER URINARY TRACT SYMPTOMS PRESENT: ICD-10-CM

## 2024-09-18 DIAGNOSIS — N48.9 DISEASE OF PENIS: ICD-10-CM

## 2024-09-18 DIAGNOSIS — N52.9 INABILITY TO ATTAIN ERECTION: ICD-10-CM

## 2024-09-18 PROCEDURE — 3074F SYST BP LT 130 MM HG: CPT | Performed by: UROLOGY

## 2024-09-18 PROCEDURE — 1123F ACP DISCUSS/DSCN MKR DOCD: CPT | Performed by: UROLOGY

## 2024-09-18 PROCEDURE — 1159F MED LIST DOCD IN RCRD: CPT | Performed by: UROLOGY

## 2024-09-18 PROCEDURE — 99204 OFFICE O/P NEW MOD 45 MIN: CPT | Performed by: UROLOGY

## 2024-09-18 PROCEDURE — 3078F DIAST BP <80 MM HG: CPT | Performed by: UROLOGY

## 2024-09-18 RX ORDER — TADALAFIL 5 MG/1
5 TABLET ORAL DAILY
Qty: 30 TABLET | Refills: 11 | Status: SHIPPED | OUTPATIENT
Start: 2024-09-18 | End: 2025-09-13

## 2024-09-18 NOTE — PROGRESS NOTES
Subjective   Patient ID: Collins Acevedo is a 78 y.o. male new patient kindly referred by Dr. Shashi Causey who presents for Erectile Dysfunction.    HPI  The patient relates that he has not had a decent erection in about 4 years. His penis is retracting. He states his last erection was about 1 year ago but it only lasted about 30 seconds. He got oral medication but his wife is concerned about his age, weight, and heart, and he did not try it. His libido is low. He does not think his testosterone level has been checked before.     The patient relates that he has prostate problems and his urinary flow is not strong.     PMH includes arthritis and HLD controlled with medication. He denies HTN, heart and lung problems.     PSA Results  Component  Ref Range & Units 1 mo ago 1 yr ago 3 yr ago 5 yr ago 6 yr ago   Prostate Specific Antigen,Screen  <=4.00 ng/mL 2.23 1.92 R, CM 1.45 R, CM 2.13 R, CM 2.20 R, CM     Past Medical History  No past medical history on file.     Surgical History  Past Surgical History:   Procedure Laterality Date    KNEE           Social History  He reports that he has quit smoking. His smoking use included cigarettes. He has never used smokeless tobacco. He reports current alcohol use.  Drug: Marijuana.    Family History  Family History   Problem Relation Name Age of Onset    Colon cancer Father      Parkinsonism Sister      Dementia Sister         Medications    Current Outpatient Medications:     acetaminophen (Tylenol 8 HOUR) 650 mg ER tablet, Take 1 tablet (650 mg) by mouth 4 times a day. As needed, Disp: , Rfl:     cholecalciferol (Vitamin D3) 50 MCG (2000 UT) tablet, Take 1 tablet (50 mcg) by mouth once daily., Disp: , Rfl:     docusate sodium (Colace) 100 mg capsule, Take 1 capsule (100 mg) by mouth every 12 hours if needed for constipation., Disp: , Rfl:     meloxicam (Mobic) 7.5 mg tablet, Take 1 tablet (7.5 mg) by mouth once daily., Disp: 90 tablet, Rfl: 3    mupirocin (Bactroban)  2 % ointment, Apply topically., Disp: , Rfl:     ofloxacin (Ocuflox) 0.3 % ophthalmic solution, Administer 1 drop into the right eye 4 times a day., Disp: , Rfl:     omega-3/dha/epa/fish oil (OMEGA-3 ORAL), Take by mouth., Disp: , Rfl:     orphenadrine (Norflex) 100 mg 12 hr tablet, [id number] Medication orphenadrine citrate  mg tablet,extended release orphenadrine citrate  mg tablet,extended release 100 mg 8/12/2016 Active (Outside), Disp: , Rfl:     oxyCODONE-acetaminophen (Percocet) 5-325 mg tablet, [id number] Medication oxycodone-acetaminophen 5 mg-325 mg tablet oxycodone-acetaminophen 5 mg-325 mg tablet 5-325 mg 11/18/2017 Active (Outside), Disp: , Rfl:     pantoprazole (ProtoNix) 40 mg EC tablet, TAKE 1 TABLET BY MOUTH ONCE  DAILY IN THE MORNING BEFORE A  MEAL, Disp: 90 tablet, Rfl: 3    rosuvastatin (Crestor) 10 mg tablet, Take 1 tablet (10 mg) by mouth once daily at bedtime., Disp: 90 tablet, Rfl: 3     Allergies  Penicillins     Review of Systems  A 12 system review was completed and is negative with the exception of those signs and symptoms noted in the history of present illness.    Objective   Physical Exam  General: in NAD, appears stated age  Head: normocephalic, atraumatic  Neck: supple; trachea is midline  Respiratory: normal effort, no use of accessory muscles  Cardiovascular: no peripheral edema  Abdomen: soft, nondistended, nontender, no rebound or guarding, no organomegaly, no CVA tenderness, no hernia  Lymphatic: no lymphadenopathy noted  Skin: normal turgor, no rashes  Neurologic: grossly intact, oriented to person/place/time  Psychiatric: mode and affect appropriate    Assessment/Plan   Problem List Items Addressed This Visit             ICD-10-CM    BPH (benign prostatic hyperplasia) N40.0    Relevant Orders    POCT UA Automated manually resulted     Other Visit Diagnoses         Codes    Disease of penis     N48.9          We discussed taking tadalafil 5 mg daily will help with  urination as well as erections. Order testosterone level to be checked in the morning. Order tadalafil 5 mg daily. We will message the patient with lab results and schedule follow-up prn.       Scribe Attestation  By signing my name below, I, Son Alvarenga   attest that this documentation has been prepared under the direction and in the presence of Daniel Rodas MD.

## 2025-05-29 DIAGNOSIS — K22.2 SCHATZKI'S RING: ICD-10-CM

## 2025-05-29 DIAGNOSIS — M50.90 CERVICAL DISC DISEASE: ICD-10-CM

## 2025-05-29 RX ORDER — PANTOPRAZOLE SODIUM 40 MG/1
40 TABLET, DELAYED RELEASE ORAL
Qty: 90 TABLET | Refills: 3 | Status: SHIPPED | OUTPATIENT
Start: 2025-05-29

## 2025-05-29 RX ORDER — MELOXICAM 7.5 MG/1
7.5 TABLET ORAL DAILY
Qty: 90 TABLET | Refills: 3 | Status: SHIPPED | OUTPATIENT
Start: 2025-05-29

## 2025-07-16 ENCOUNTER — APPOINTMENT (OUTPATIENT)
Dept: PRIMARY CARE | Facility: CLINIC | Age: 80
End: 2025-07-16
Payer: MEDICARE

## 2025-07-16 VITALS
DIASTOLIC BLOOD PRESSURE: 80 MMHG | HEIGHT: 65 IN | BODY MASS INDEX: 33.15 KG/M2 | RESPIRATION RATE: 14 BRPM | SYSTOLIC BLOOD PRESSURE: 118 MMHG | WEIGHT: 199 LBS | OXYGEN SATURATION: 96 % | HEART RATE: 67 BPM

## 2025-07-16 DIAGNOSIS — G25.81 RESTLESS LEGS SYNDROME: ICD-10-CM

## 2025-07-16 DIAGNOSIS — N40.0 BENIGN PROSTATIC HYPERPLASIA, UNSPECIFIED WHETHER LOWER URINARY TRACT SYMPTOMS PRESENT: ICD-10-CM

## 2025-07-16 DIAGNOSIS — E53.8 VITAMIN B12 DEFICIENCY: ICD-10-CM

## 2025-07-16 DIAGNOSIS — E55.9 MILD VITAMIN D DEFICIENCY: ICD-10-CM

## 2025-07-16 DIAGNOSIS — E78.5 DYSLIPIDEMIA: ICD-10-CM

## 2025-07-16 DIAGNOSIS — Z00.00 MEDICARE ANNUAL WELLNESS VISIT, SUBSEQUENT: Primary | ICD-10-CM

## 2025-07-16 PROBLEM — C84.00: Status: RESOLVED | Noted: 2023-06-22 | Resolved: 2025-07-16

## 2025-07-16 PROCEDURE — 3079F DIAST BP 80-89 MM HG: CPT | Performed by: INTERNAL MEDICINE

## 2025-07-16 PROCEDURE — 1124F ACP DISCUSS-NO DSCNMKR DOCD: CPT | Performed by: INTERNAL MEDICINE

## 2025-07-16 PROCEDURE — G0439 PPPS, SUBSEQ VISIT: HCPCS | Performed by: INTERNAL MEDICINE

## 2025-07-16 PROCEDURE — 1170F FXNL STATUS ASSESSED: CPT | Performed by: INTERNAL MEDICINE

## 2025-07-16 PROCEDURE — 1123F ACP DISCUSS/DSCN MKR DOCD: CPT | Performed by: INTERNAL MEDICINE

## 2025-07-16 PROCEDURE — 3074F SYST BP LT 130 MM HG: CPT | Performed by: INTERNAL MEDICINE

## 2025-07-16 PROCEDURE — 1159F MED LIST DOCD IN RCRD: CPT | Performed by: INTERNAL MEDICINE

## 2025-07-16 RX ORDER — DIPHENHYDRAMINE HCL 50 MG
50 CAPSULE ORAL EVERY 6 HOURS PRN
COMMUNITY

## 2025-07-16 RX ORDER — VALACYCLOVIR HYDROCHLORIDE 500 MG/1
500 TABLET, FILM COATED ORAL 2 TIMES DAILY
COMMUNITY

## 2025-07-16 RX ORDER — PRAMIPEXOLE DIHYDROCHLORIDE 0.12 MG/1
0.12 TABLET ORAL NIGHTLY PRN
Qty: 90 TABLET | Refills: 3 | Status: SHIPPED | OUTPATIENT
Start: 2025-07-16 | End: 2025-07-16

## 2025-07-16 RX ORDER — TRAZODONE HYDROCHLORIDE 50 MG/1
50 TABLET ORAL NIGHTLY
COMMUNITY

## 2025-07-16 RX ORDER — PRAMIPEXOLE DIHYDROCHLORIDE 0.12 MG/1
0.12 TABLET ORAL NIGHTLY PRN
Qty: 90 TABLET | Refills: 3 | Status: SHIPPED | OUTPATIENT
Start: 2025-07-16 | End: 2026-07-16

## 2025-07-16 ASSESSMENT — ENCOUNTER SYMPTOMS
FEVER: 0
DIAPHORESIS: 0
CONSTIPATION: 0
VOMITING: 0
CHILLS: 0
MYALGIAS: 0
DIARRHEA: 0
JOINT SWELLING: 0
BACK PAIN: 1
SHORTNESS OF BREATH: 0
COUGH: 0
NAUSEA: 0

## 2025-07-16 ASSESSMENT — ACTIVITIES OF DAILY LIVING (ADL)
TAKING_MEDICATION: INDEPENDENT
GROCERY_SHOPPING: INDEPENDENT
DOING_HOUSEWORK: INDEPENDENT
DRESSING: INDEPENDENT
BATHING: INDEPENDENT
MANAGING_FINANCES: INDEPENDENT

## 2025-07-16 NOTE — PATIENT INSTRUCTIONS
FASTING LABS ARE ORDERED FOR AFTER 7/29/25 SO ALL LABS GET COVERED    2.  TRIAL PRAMIPEXOLE NIGHTLY AS NEEDED FOR RESTLESS LEGS, DO NOT TAKE THE HERBAL FOR YOUR LEGS IF YOU ARE TAKING THIS.  IF NOT HELPING IN 2 WEEKS, PLEASE CALL BACK AND I CAN INCREASE THE DOSE    3.  REGULAR EYE EXAMS AS YOU ARE DOING    4.  DIET/EXERCISE/WEIGHT MANAGEMENT AS YOU ARE DOING    5.  PLEASE CALL IF REFILLS ARE NEEDED    6.  NEXT COLONOSCOPY DUE 2029    7.  FOLLOW UP YEARLY OR AS NEEDED.

## 2025-07-16 NOTE — PROGRESS NOTES
"Subjective   Reason for Visit: Collins Acevedo is an 79 y.o. male here for a Medicare Wellness visit.          Reviewed all medications by prescribing practitioner or clinical pharmacist (such as prescriptions, OTCs, herbal therapies and supplements) and documented in the medical record.    HPI  NO NEW PROBLEMS TO SPEAK OF, JUST ARTHRITIS    HAVE JUMPY LEGS, WHEN LAY DOWN, GET INVOLUNTARY JERKING HARD TO FALL ASLEEP.    DRINKING JENKINS JUICE, AND TAKING AN HERBAL THAT HAS SOME VERY MILD CONCENTRATION OF ARSENIC.    IF RESTLESS LEG SETS IN THEN GET A CRAMP AND HAVE TO STAND UP AND WALK AROUND    HAVE A BOWL ALLBRAN CEREAL AND TAKE COLACE DAILY TON HELP WITH BM    LAST COLONOSCOPY LAST YEAR.    NO REAL CORRELATION BETWEEN CRESTOR AND LEG JUMPING ( BEEN FOR MORE THAN 5 YEARS)    NOT HAVING OUTBREAKS SO TAKE VALACYCLOVIR RARELY, EVERY 2 YEARS    GET EYES CHECKED YEARLY    QUIT SMOKING 55 YEARS AGO.      Patient Care Team:  Shashi Causey MD as PCP - General (Internal Medicine)     Review of Systems   Constitutional:  Negative for chills, diaphoresis and fever.   Respiratory:  Negative for cough and shortness of breath.    Cardiovascular:  Negative for chest pain and leg swelling.   Gastrointestinal:  Negative for constipation, diarrhea, nausea and vomiting.   Genitourinary:         SEE HPI   Musculoskeletal:  Positive for back pain. Negative for joint swelling and myalgias.   Neurological:         RESTLESS LEGS AT NIGHT       Objective   Vitals:  /80   Pulse 67   Resp 14   Ht 1.651 m (5' 5\")   Wt 90.3 kg (199 lb)   SpO2 96%   BMI 33.12 kg/m²       Physical Exam  Vitals reviewed.   Constitutional:       General: He is not in acute distress.     Appearance: He is not ill-appearing.     Cardiovascular:      Rate and Rhythm: Normal rate and regular rhythm.      Pulses: Normal pulses.      Heart sounds:      No gallop.   Pulmonary:      Breath sounds: Normal breath sounds. No wheezing, rhonchi or rales. "   Abdominal:      General: Abdomen is flat. Bowel sounds are normal.      Palpations: Abdomen is soft.      Tenderness: There is no guarding or rebound.     Musculoskeletal:      Right lower leg: No edema.      Left lower leg: No edema.     Skin:     General: Skin is warm and dry.     Neurological:      General: No focal deficit present.      Mental Status: He is oriented to person, place, and time. Mental status is at baseline.     Psychiatric:         Mood and Affect: Mood normal.         Behavior: Behavior normal.         Assessment & Plan  Restless legs syndrome    Orders:    pramipexole (Mirapex) 0.125 mg tablet; Take 1 tablet (0.125 mg) by mouth as needed at bedtime (RESTLESS LEGS).    Benign prostatic hyperplasia, unspecified whether lower urinary tract symptoms present    Orders:    Prostate Specific Antigen; Future    Dyslipidemia    Orders:    Lipid Panel; Future    TSH with reflex to Free T4 if abnormal; Future    Comprehensive Metabolic Panel; Future    CBC; Future    Mild vitamin D deficiency    Orders:    Vitamin D 25-Hydroxy,Total (for eval of Vitamin D levels); Future    Vitamin B12 deficiency    Orders:    Vitamin B12; Future    Medicare annual wellness visit, subsequent            Patient Instructions    FASTING LABS ARE ORDERED FOR AFTER 7/29/25 SO ALL LABS GET COVERED    2.  TRIAL PRAMIPEXOLE NIGHTLY AS NEEDED FOR RESTLESS LEGS, DO NOT TAKE THE HERBAL FOR YOUR LEGS IF YOU ARE TAKING THIS.  IF NOT HELPING IN 2 WEEKS, PLEASE CALL BACK AND I CAN INCREASE THE DOSE    3.  REGULAR EYE EXAMS AS YOU ARE DOING    4.  DIET/EXERCISE/WEIGHT MANAGEMENT AS YOU ARE DOING    5.  PLEASE CALL IF REFILLS ARE NEEDED    6.  NEXT COLONOSCOPY DUE 2029    7.  FOLLOW UP YEARLY OR AS NEEDED.

## 2025-08-05 DIAGNOSIS — R73.9 HYPERGLYCEMIA: Primary | ICD-10-CM

## 2025-08-07 LAB
25(OH)D3+25(OH)D2 SERPL-MCNC: 71 NG/ML (ref 30–100)
ALBUMIN SERPL-MCNC: 4 G/DL (ref 3.6–5.1)
ALP SERPL-CCNC: 83 U/L (ref 35–144)
ALT SERPL-CCNC: 16 U/L (ref 9–46)
ANION GAP SERPL CALCULATED.4IONS-SCNC: 5 MMOL/L (CALC) (ref 7–17)
AST SERPL-CCNC: 20 U/L (ref 10–35)
BILIRUB SERPL-MCNC: 0.6 MG/DL (ref 0.2–1.2)
BUN SERPL-MCNC: 22 MG/DL (ref 7–25)
CALCIUM SERPL-MCNC: 9 MG/DL (ref 8.6–10.3)
CHLORIDE SERPL-SCNC: 107 MMOL/L (ref 98–110)
CHOLEST SERPL-MCNC: 109 MG/DL
CHOLEST/HDLC SERPL: 2.3 (CALC)
CO2 SERPL-SCNC: 27 MMOL/L (ref 20–32)
CREAT SERPL-MCNC: 0.98 MG/DL (ref 0.7–1.22)
EGFRCR SERPLBLD CKD-EPI 2021: 78 ML/MIN/1.73M2
ERYTHROCYTE [DISTWIDTH] IN BLOOD BY AUTOMATED COUNT: 13 % (ref 11–15)
EST. AVERAGE GLUCOSE BLD GHB EST-MCNC: 120 MG/DL
EST. AVERAGE GLUCOSE BLD GHB EST-SCNC: 6.6 MMOL/L
GLUCOSE SERPL-MCNC: 103 MG/DL (ref 65–99)
HBA1C MFR BLD: 5.8 %
HCT VFR BLD AUTO: 44.4 % (ref 38.5–50)
HDLC SERPL-MCNC: 47 MG/DL
HGB BLD-MCNC: 14.1 G/DL (ref 13.2–17.1)
LDLC SERPL CALC-MCNC: 40 MG/DL (CALC)
MCH RBC QN AUTO: 30.5 PG (ref 27–33)
MCHC RBC AUTO-ENTMCNC: 31.8 G/DL (ref 32–36)
MCV RBC AUTO: 95.9 FL (ref 80–100)
NONHDLC SERPL-MCNC: 62 MG/DL (CALC)
PLATELET # BLD AUTO: 216 THOUSAND/UL (ref 140–400)
PMV BLD REES-ECKER: 10.9 FL (ref 7.5–12.5)
POTASSIUM SERPL-SCNC: 4.9 MMOL/L (ref 3.5–5.3)
PROT SERPL-MCNC: 5.8 G/DL (ref 6.1–8.1)
PSA SERPL-MCNC: 1.77 NG/ML
RBC # BLD AUTO: 4.63 MILLION/UL (ref 4.2–5.8)
SODIUM SERPL-SCNC: 139 MMOL/L (ref 135–146)
TRIGL SERPL-MCNC: 134 MG/DL
TSH SERPL-ACNC: 3.27 MIU/L (ref 0.4–4.5)
VIT B12 SERPL-MCNC: 330 PG/ML (ref 200–1100)
WBC # BLD AUTO: 6.7 THOUSAND/UL (ref 3.8–10.8)

## 2025-08-08 ENCOUNTER — HOSPITAL ENCOUNTER (OUTPATIENT)
Dept: RADIOLOGY | Facility: CLINIC | Age: 80
Discharge: HOME | End: 2025-08-08
Payer: MEDICARE

## 2025-08-08 ENCOUNTER — HOSPITAL ENCOUNTER (EMERGENCY)
Facility: HOSPITAL | Age: 80
Discharge: HOME | End: 2025-08-08
Payer: MEDICARE

## 2025-08-08 ENCOUNTER — OFFICE VISIT (OUTPATIENT)
Dept: ORTHOPEDIC SURGERY | Facility: CLINIC | Age: 80
End: 2025-08-08
Payer: MEDICARE

## 2025-08-08 ENCOUNTER — APPOINTMENT (OUTPATIENT)
Dept: RADIOLOGY | Facility: HOSPITAL | Age: 80
End: 2025-08-08
Payer: MEDICARE

## 2025-08-08 VITALS
TEMPERATURE: 97.7 F | SYSTOLIC BLOOD PRESSURE: 148 MMHG | HEART RATE: 61 BPM | WEIGHT: 200 LBS | RESPIRATION RATE: 16 BRPM | DIASTOLIC BLOOD PRESSURE: 78 MMHG | OXYGEN SATURATION: 97 % | BODY MASS INDEX: 32.14 KG/M2 | HEIGHT: 66 IN

## 2025-08-08 DIAGNOSIS — R07.81 RIB PAIN ON RIGHT SIDE: Primary | ICD-10-CM

## 2025-08-08 DIAGNOSIS — S22.31XA CLOSED FRACTURE OF ONE RIB OF RIGHT SIDE, INITIAL ENCOUNTER: Primary | ICD-10-CM

## 2025-08-08 DIAGNOSIS — R07.81 RIB PAIN ON RIGHT SIDE: ICD-10-CM

## 2025-08-08 LAB
ANION GAP SERPL CALC-SCNC: 9 MMOL/L (ref 10–20)
BASOPHILS # BLD AUTO: 0.04 X10*3/UL (ref 0–0.1)
BASOPHILS NFR BLD AUTO: 0.4 %
BUN SERPL-MCNC: 24 MG/DL (ref 6–23)
CALCIUM SERPL-MCNC: 8.7 MG/DL (ref 8.6–10.3)
CHLORIDE SERPL-SCNC: 107 MMOL/L (ref 98–107)
CO2 SERPL-SCNC: 24 MMOL/L (ref 21–32)
CREAT SERPL-MCNC: 0.9 MG/DL (ref 0.5–1.3)
EGFRCR SERPLBLD CKD-EPI 2021: 86 ML/MIN/1.73M*2
EOSINOPHIL # BLD AUTO: 0.15 X10*3/UL (ref 0–0.4)
EOSINOPHIL NFR BLD AUTO: 1.7 %
ERYTHROCYTE [DISTWIDTH] IN BLOOD BY AUTOMATED COUNT: 13.4 % (ref 11.5–14.5)
GLUCOSE SERPL-MCNC: 100 MG/DL (ref 74–99)
HCT VFR BLD AUTO: 42.2 % (ref 41–52)
HGB BLD-MCNC: 13.9 G/DL (ref 13.5–17.5)
IMM GRANULOCYTES # BLD AUTO: 0.03 X10*3/UL (ref 0–0.5)
IMM GRANULOCYTES NFR BLD AUTO: 0.3 % (ref 0–0.9)
LYMPHOCYTES # BLD AUTO: 1.25 X10*3/UL (ref 0.8–3)
LYMPHOCYTES NFR BLD AUTO: 14.1 %
MCH RBC QN AUTO: 30.2 PG (ref 26–34)
MCHC RBC AUTO-ENTMCNC: 32.9 G/DL (ref 32–36)
MCV RBC AUTO: 92 FL (ref 80–100)
MONOCYTES # BLD AUTO: 0.53 X10*3/UL (ref 0.05–0.8)
MONOCYTES NFR BLD AUTO: 6 %
NEUTROPHILS # BLD AUTO: 6.89 X10*3/UL (ref 1.6–5.5)
NEUTROPHILS NFR BLD AUTO: 77.5 %
NRBC BLD-RTO: 0 /100 WBCS (ref 0–0)
PLATELET # BLD AUTO: 195 X10*3/UL (ref 150–450)
POTASSIUM SERPL-SCNC: 4.6 MMOL/L (ref 3.5–5.3)
RBC # BLD AUTO: 4.6 X10*6/UL (ref 4.5–5.9)
SODIUM SERPL-SCNC: 135 MMOL/L (ref 136–145)
WBC # BLD AUTO: 8.9 X10*3/UL (ref 4.4–11.3)

## 2025-08-08 PROCEDURE — 71100 X-RAY EXAM RIBS UNI 2 VIEWS: CPT | Mod: RT

## 2025-08-08 PROCEDURE — 71260 CT THORAX DX C+: CPT | Performed by: RADIOLOGY

## 2025-08-08 PROCEDURE — 85025 COMPLETE CBC W/AUTO DIFF WBC: CPT | Performed by: PHYSICIAN ASSISTANT

## 2025-08-08 PROCEDURE — 72131 CT LUMBAR SPINE W/O DYE: CPT | Performed by: RADIOLOGY

## 2025-08-08 PROCEDURE — 99285 EMERGENCY DEPT VISIT HI MDM: CPT | Mod: 25

## 2025-08-08 PROCEDURE — 84132 ASSAY OF SERUM POTASSIUM: CPT | Performed by: PHYSICIAN ASSISTANT

## 2025-08-08 PROCEDURE — 36415 COLL VENOUS BLD VENIPUNCTURE: CPT | Performed by: PHYSICIAN ASSISTANT

## 2025-08-08 PROCEDURE — 2550000001 HC RX 255 CONTRASTS: Performed by: PHYSICIAN ASSISTANT

## 2025-08-08 PROCEDURE — 72128 CT CHEST SPINE W/O DYE: CPT | Performed by: RADIOLOGY

## 2025-08-08 PROCEDURE — 2500000004 HC RX 250 GENERAL PHARMACY W/ HCPCS (ALT 636 FOR OP/ED): Performed by: PHYSICIAN ASSISTANT

## 2025-08-08 PROCEDURE — 74177 CT ABD & PELVIS W/CONTRAST: CPT

## 2025-08-08 PROCEDURE — 99212 OFFICE O/P EST SF 10 MIN: CPT | Performed by: INTERNAL MEDICINE

## 2025-08-08 PROCEDURE — 2500000001 HC RX 250 WO HCPCS SELF ADMINISTERED DRUGS (ALT 637 FOR MEDICARE OP): Performed by: PHYSICIAN ASSISTANT

## 2025-08-08 PROCEDURE — 82565 ASSAY OF CREATININE: CPT | Performed by: PHYSICIAN ASSISTANT

## 2025-08-08 PROCEDURE — 71260 CT THORAX DX C+: CPT

## 2025-08-08 PROCEDURE — 74177 CT ABD & PELVIS W/CONTRAST: CPT | Performed by: RADIOLOGY

## 2025-08-08 RX ORDER — DIAZEPAM 5 MG/ML
5 INJECTION, SOLUTION INTRAMUSCULAR; INTRAVENOUS ONCE
Status: DISCONTINUED | OUTPATIENT
Start: 2025-08-08 | End: 2025-08-08 | Stop reason: HOSPADM

## 2025-08-08 RX ORDER — LIDOCAINE 50 MG/G
1 PATCH TOPICAL DAILY
Qty: 10 PATCH | Refills: 0 | Status: SHIPPED | OUTPATIENT
Start: 2025-08-08 | End: 2025-08-18

## 2025-08-08 RX ORDER — IBUPROFEN 600 MG/1
600 TABLET, FILM COATED ORAL ONCE
Status: COMPLETED | OUTPATIENT
Start: 2025-08-08 | End: 2025-08-08

## 2025-08-08 RX ORDER — OXYCODONE AND ACETAMINOPHEN 5; 325 MG/1; MG/1
1 TABLET ORAL EVERY 6 HOURS PRN
Qty: 12 TABLET | Refills: 0 | Status: SHIPPED | OUTPATIENT
Start: 2025-08-08 | End: 2025-08-11

## 2025-08-08 RX ORDER — OXYCODONE AND ACETAMINOPHEN 5; 325 MG/1; MG/1
1 TABLET ORAL ONCE
Refills: 0 | Status: COMPLETED | OUTPATIENT
Start: 2025-08-08 | End: 2025-08-08

## 2025-08-08 RX ORDER — ONDANSETRON 4 MG/1
4 TABLET, ORALLY DISINTEGRATING ORAL ONCE
Status: COMPLETED | OUTPATIENT
Start: 2025-08-08 | End: 2025-08-08

## 2025-08-08 RX ORDER — CYCLOBENZAPRINE HCL 10 MG
10 TABLET ORAL 2 TIMES DAILY PRN
Qty: 10 TABLET | Refills: 0 | Status: SHIPPED | OUTPATIENT
Start: 2025-08-08 | End: 2025-08-13

## 2025-08-08 RX ADMIN — IOHEXOL 75 ML: 350 INJECTION, SOLUTION INTRAVENOUS at 13:47

## 2025-08-08 RX ADMIN — OXYCODONE HYDROCHLORIDE AND ACETAMINOPHEN 1 TABLET: 5; 325 TABLET ORAL at 15:13

## 2025-08-08 RX ADMIN — IBUPROFEN 600 MG: 600 TABLET ORAL at 12:31

## 2025-08-08 RX ADMIN — ONDANSETRON 4 MG: 4 TABLET, ORALLY DISINTEGRATING ORAL at 15:12

## 2025-08-08 ASSESSMENT — PAIN DESCRIPTION - LOCATION: LOCATION: BACK

## 2025-08-08 ASSESSMENT — PAIN SCALES - GENERAL
PAINLEVEL_OUTOF10: 9
PAINLEVEL_OUTOF10: 1

## 2025-08-08 ASSESSMENT — PAIN DESCRIPTION - ORIENTATION: ORIENTATION: RIGHT

## 2025-08-08 ASSESSMENT — PAIN - FUNCTIONAL ASSESSMENT: PAIN_FUNCTIONAL_ASSESSMENT: 0-10

## 2025-08-08 ASSESSMENT — LIFESTYLE VARIABLES
TOTAL SCORE: 0
EVER HAD A DRINK FIRST THING IN THE MORNING TO STEADY YOUR NERVES TO GET RID OF A HANGOVER: NO
HAVE PEOPLE ANNOYED YOU BY CRITICIZING YOUR DRINKING: NO
EVER FELT BAD OR GUILTY ABOUT YOUR DRINKING: NO
HAVE YOU EVER FELT YOU SHOULD CUT DOWN ON YOUR DRINKING: NO

## 2025-08-08 ASSESSMENT — PAIN DESCRIPTION - PAIN TYPE: TYPE: ACUTE PAIN

## 2025-08-08 NOTE — ED PROVIDER NOTES
HPI   Chief Complaint   Patient presents with    Back Pain     Pt fell Wednesday on his boat hit the right side of back and went to ortho they said he had some fx ribs and sent him to the er       79-year-old male with a past medical history of Hyperlipidemia, GERD, hypertension, BPH presents to the emergency department after a fall.  He states 2 days ago, he was on his boat and he fell backward, striking his mid/low back.  He did not hit his head or lose consciousness.  He states yesterday, he was in bed most of the day because his back hurts so much.  Today, he went to an orthopedic doctor who told him to come to the emergency department to have a CAT scan.  Patient does not take any blood thinners.  Points to the paraspinous muscles of the upper lumbar spine as the area of pain.  Patient denies incontinence, sensory motor changes, urinary retention, saddle anesthesia, radiation into the legs/abdomen/groin.  He can walk but with pain.  He denies hematemesis, bloody stools, bloody urine, abdominal pain, chest pain, difficulty breathing                Patient History   Medical History[1]  Surgical History[2]  Family History[3]  Social History[4]    Physical Exam   ED Triage Vitals [08/08/25 1102]   Temperature Heart Rate Respirations BP   36.5 °C (97.7 °F) 61 16 144/73      Pulse Ox Temp Source Heart Rate Source Patient Position   99 % Temporal Monitor --      BP Location FiO2 (%)     -- --       Physical Exam  Vitals and nursing note reviewed.   Constitutional:       General: He is not in acute distress.  HENT:      Head: Atraumatic.      Mouth/Throat:      Mouth: Mucous membranes are moist.      Pharynx: Oropharynx is clear.     Eyes:      Extraocular Movements: Extraocular movements intact.      Conjunctiva/sclera: Conjunctivae normal.      Pupils: Pupils are equal, round, and reactive to light.       Cardiovascular:      Rate and Rhythm: Normal rate and regular rhythm.      Pulses: Normal pulses.   Pulmonary:       Effort: Pulmonary effort is normal. No respiratory distress.      Breath sounds: Normal breath sounds.   Abdominal:      General: There is no distension.      Palpations: Abdomen is soft.      Tenderness: There is no abdominal tenderness. There is no guarding or rebound.     Musculoskeletal:         General: No deformity.      Cervical back: Neck supple.      Comments: Tenderness to palpation of the right paraspinous muscles of the low T/upper L-spine with no evidence of trauma.  C, T, L-Spine appears normal with no step-offs, deformity or evidence of trauma.  Spinous processes are nontender.  Full range of motion of the C-spine intact with no limitation or pain.  Limited range of motion of T and L-spine secondary to pain.  FROM and sensation are intact in upper and lower extremities. 5/5 strength equal bilaterally. 2+ pulses present and capillary refill <2 seconds.           Skin:     General: Skin is warm and dry.     Neurological:      Mental Status: He is alert and oriented to person, place, and time. Mental status is at baseline.      Cranial Nerves: No cranial nerve deficit.      Sensory: No sensory deficit.      Motor: No weakness.     Psychiatric:         Mood and Affect: Mood normal.         Behavior: Behavior normal.           ED Course & MDM   Diagnoses as of 08/08/25 1504   Closed fracture of one rib of right side, initial encounter                 No data recorded     M[place] Coma Scale Score: 15 (08/08/25 1107 : Maria Del Rosario Mejia RN)                           Medical Decision Making  -year-old male presents emergency department for back pain after a fall 2 days ago.  On my exam, he is tender over the paraspinous muscles of the low T/upper L-spine on the right.  He has no evidence of trauma anywhere on the body.  He has full range of motion, strength and sensation of his upper and lower extremities.  His spine is nontender with no step-offs or deformities.  Abdomen is soft and nontender.  The  patient is insistent that he does not want narcotics.  He was treated with ibuprofen and Valium  CT chest abdomen pelvis with T and L spines performed.  The results show no acute abnormalities other than a minimally displaced fracture of the right posterior 11th rib.  On my repeat examination, patient states he is starting to have worsening pain because of laying down for the CAT scan and getting up to go to the bathroom.  I offered him something stronger for pain again, and he accepts this time.  I ordered Percocet.  I discussed his results and ordered an incentive spirometer to be dispensed to him.  I prescribed him Percocet, Flexeril and lidocaine patches.  Recommended close follow-up with primary care.  Recommended RICE.  Discussed results with patient and/or family/friend and recommended close follow up with primary care or specialist.  Reviewed return precautions at length.  I answered all questions.           Procedure  Procedures         [1] History reviewed. No pertinent past medical history.  [2]   Past Surgical History:  Procedure Laterality Date    KNEE     [3]   Family History  Problem Relation Name Age of Onset    Colon cancer Father      Parkinsonism Sister      Dementia Sister     [4]   Social History  Tobacco Use    Smoking status: Former     Types: Cigarettes    Smokeless tobacco: Never   Vaping Use    Vaping status: Never Used   Substance Use Topics    Alcohol use: Yes     Comment: DAILY MODERATE    Drug use: Not on file     Comment: THC MINTS /EDIABLES        Claudette Lebron PA-C  08/08/25 2683

## 2025-08-08 NOTE — PROGRESS NOTES
Acute Injury New Patient Visit    CC:   Chief Complaint   Patient presents with    Chest - Pain     Right sided rib pain       HPI: Collins is a 79 y.o. male presents today for evaluation for acute right-sided rib injury sustained after a fall 2 days ago. He notes right-sided rib pain and dyspnea. He is here for initial evaluation and x-rays.  Having difficulty breathing.  He is currently on no anticoagulation.  No bowel movement since his injury.    Review of Systems   GENERAL: Negative for malaise, significant weight loss, fever  MUSCULOSKELETAL: See HPI  NEURO:  Negative for numbness / tingling     Past Medical History  Medical History[1]    Medication review  Medication Documentation Review Audit       Reviewed by Nely Corey CMA (Medical Assistant) on 07/16/25 at 0929      Medication Order Taking? Sig Documenting Provider Last Dose Status   acetaminophen (Tylenol 8 HOUR) 650 mg ER tablet [id number]  Take 1 tablet (650 mg) by mouth 4 times a day. As needed   Patient not taking: Reported on 7/16/2025    Historical Provider, MD  Active   cholecalciferol (Vitamin D3) 50 MCG (2000 UT) tablet [id number]  Take 1 tablet (50 mcg) by mouth once daily. Historical Provider, MD  Active   diphenhydrAMINE (BENADryl) 50 mg capsule [id number]  Take 1 capsule (50 mg) by mouth every 6 hours if needed for itching. Historical Provider, MD  Active   docusate sodium (Colace) 100 mg capsule [id number] Yes Take 1 capsule (100 mg) by mouth every 12 hours if needed for constipation. Historical Provider, MD  Active   meloxicam (Mobic) 7.5 mg tablet [id number]  TAKE 1 TABLET BY MOUTH ONCE  DAILY   Patient not taking: Reported on 7/16/2025    Shashi Causey MD  Active   mupirocin (Bactroban) 2 % ointment [id number]  Apply topically.   Patient not taking: Reported on 7/16/2025    Historical Provider, MD  Active   ofloxacin (Ocuflox) 0.3 % ophthalmic solution [id number]  Administer 1 drop into the right eye 4 times a day.   Patient not  taking: Reported on 7/16/2025    Historical Provider, MD  Active   omega-3/dha/epa/fish oil (OMEGA-3 ORAL) [id number] Yes Take by mouth. Historical Provider, MD  Active   orphenadrine (Norflex) 100 mg 12 hr tablet [id number]  [id number] Medication orphenadrine citrate  mg tablet,extended release orphenadrine citrate  mg tablet,extended release 100 mg 8/12/2016 Active (Outside) Historical Provider, MD  Active   oxyCODONE-acetaminophen (Percocet) 5-325 mg tablet [id number]  [id number] Medication oxycodone-acetaminophen 5 mg-325 mg tablet oxycodone-acetaminophen 5 mg-325 mg tablet 5-325 mg 11/18/2017 Active (Outside)   Patient not taking: Reported on 7/16/2025    Historical Provider, MD  Active   pantoprazole (ProtoNix) 40 mg EC tablet [id number] Yes TAKE 1 TABLET BY MOUTH ONCE  DAILY IN THE MORNING BEFORE A  MEAL Shashi Causey MD  Active   rosuvastatin (Crestor) 10 mg tablet [id number] Yes Take 1 tablet (10 mg) by mouth once daily at bedtime. Shashi Causey MD  Active   tadalafil (Cialis) 5 mg tablet [id number] Yes Take 1 tablet (5 mg) by mouth once daily. Daniel Rodas MD  Flag for Review   traZODone (Desyrel) 50 mg tablet [id number]  Take 1 tablet (50 mg) by mouth once daily at bedtime. Historical Provider, MD  Active   valACYclovir (Valtrex) 500 mg tablet [id number]  Take 1 tablet (500 mg) by mouth 2 times a day. Historical Provider, MD  Active                    Allergies  RX Allergies[2]    Social History  Social History     Socioeconomic History    Marital status:      Spouse name: Not on file    Number of children: Not on file    Years of education: Not on file    Highest education level: Not on file   Occupational History    Not on file   Tobacco Use    Smoking status: Former     Types: Cigarettes    Smokeless tobacco: Never   Vaping Use    Vaping status: Never Used   Substance and Sexual Activity    Alcohol use: Yes     Comment: DAILY MODERATE    Drug use: Not on file     Comment: THC  MINTS /EDIABLES    Sexual activity: Not on file   Other Topics Concern    Not on file   Social History Narrative    Not on file     Social Drivers of Health     Financial Resource Strain: Not on file   Food Insecurity: Not on file   Transportation Needs: Not on file   Physical Activity: Not on file   Stress: Not on file   Social Connections: Not on file   Intimate Partner Violence: Not on file   Housing Stability: Not on file       Surgical History  Surgical History[3]    Physical Exam:  GENERAL:  Patient is awake, alert, and oriented to person place and time.  Patient appears well nourished and well kept.  Affect Calm, Not Acutely Distressed.  HEENT:  Normocephalic, Atraumatic, EOMI  CARDIOVASCULAR:  Hemodynamically stable.  RESPIRATORY:  Normal respirations with unlabored breathing.  Extremity: Patient is in significant amount of pain and distress.  Significant pain over the right flank area.  Pain over the right posterior 11th and 10th rib and along the mid axillary line.  Chest examination shows adequate air entry.  No tracheal deviation.  Abdomen is soft and nondistended.  No guarding or rigidity.      Diagnostics: X-rays reviewed      Procedure: None    Assessment: Right 11th rib fracture    Plan: Collins presents today for evaluation for acute right-sided rib injury sustained after he fell. X-rays were reviewed, and confirm a right 11th rib fracture.  He has a significant mount of pain in his abdomen, and is a significant amount of distress, with difficulty breathing.  We did recommend to be evaluated the emergency room with a CT scan of his abdomen pelvis to rule out any internal organ injury or internal bleeding due to his direct trauma.  Patient was agreeable, however the EMS squad was required to transfer the patient to the emergency room.      Orders Placed This Encounter    XR ribs right 2 views      At the conclusion of the visit there were no further questions by the patient/family regarding their plan  of care.  Patient was instructed to call or return with any issues, questions, or concerns regarding their injury and/or treatment plan described above.     08/08/25 at 9:51 AM - Arina Mcneal MD  Scribe Attestation  By signing my name below, Cody SHELL Scribe   attest that this documentation has been prepared under the direction and in the presence of Arina Mcneal MD.    Office: [phone]    We already utilize the scribe attestation, Cody SHELL am scribing for, and in the presence of Dr. Mcneal.    Arina SHELL MD personally performed the services described in the documentation as scribed by Cody Rodriguez in my presence, and confirm it is both accurate and complete.    This note was prepared using voice recognition software.  The details of this note are correct and have been reviewed, and corrected to the best of my ability.  Some grammatical errors may persist related to the Dragon software.         [1] No past medical history on file.  [2]   Allergies  Allergen Reactions    Penicillins Itching and Swelling   [3]   Past Surgical History:  Procedure Laterality Date    KNEE

## 2025-08-24 DIAGNOSIS — E78.5 DYSLIPIDEMIA: ICD-10-CM

## 2025-08-25 RX ORDER — ROSUVASTATIN CALCIUM 10 MG/1
10 TABLET, COATED ORAL NIGHTLY
Qty: 90 TABLET | Refills: 3 | Status: SHIPPED | OUTPATIENT
Start: 2025-08-25

## 2025-08-28 ENCOUNTER — APPOINTMENT (OUTPATIENT)
Dept: PRIMARY CARE | Facility: CLINIC | Age: 80
End: 2025-08-28
Payer: MEDICARE

## 2025-08-28 VITALS
BODY MASS INDEX: 32.47 KG/M2 | OXYGEN SATURATION: 96 % | DIASTOLIC BLOOD PRESSURE: 80 MMHG | WEIGHT: 202 LBS | HEIGHT: 66 IN | SYSTOLIC BLOOD PRESSURE: 136 MMHG | HEART RATE: 66 BPM | RESPIRATION RATE: 14 BRPM

## 2025-08-28 DIAGNOSIS — G25.81 RESTLESS LEGS SYNDROME: ICD-10-CM

## 2025-08-28 DIAGNOSIS — S22.31XD CLOSED FRACTURE OF ONE RIB OF RIGHT SIDE WITH ROUTINE HEALING: Primary | ICD-10-CM

## 2025-08-28 PROCEDURE — 1036F TOBACCO NON-USER: CPT | Performed by: INTERNAL MEDICINE

## 2025-08-28 PROCEDURE — 1159F MED LIST DOCD IN RCRD: CPT | Performed by: INTERNAL MEDICINE

## 2025-08-28 PROCEDURE — 3075F SYST BP GE 130 - 139MM HG: CPT | Performed by: INTERNAL MEDICINE

## 2025-08-28 PROCEDURE — 99213 OFFICE O/P EST LOW 20 MIN: CPT | Performed by: INTERNAL MEDICINE

## 2025-08-28 PROCEDURE — 3079F DIAST BP 80-89 MM HG: CPT | Performed by: INTERNAL MEDICINE

## 2025-08-28 RX ORDER — PRAMIPEXOLE DIHYDROCHLORIDE 0.12 MG/1
0.12 TABLET ORAL NIGHTLY PRN
Qty: 90 TABLET | Refills: 3 | Status: SHIPPED | OUTPATIENT
Start: 2025-08-28 | End: 2026-08-28

## 2025-08-28 ASSESSMENT — ENCOUNTER SYMPTOMS
VOMITING: 0
CONSTIPATION: 0
DIARRHEA: 0
DIAPHORESIS: 0
CHILLS: 0
MYALGIAS: 0
SHORTNESS OF BREATH: 0
FEVER: 0
NAUSEA: 0
COUGH: 0
JOINT SWELLING: 0

## 2025-08-28 ASSESSMENT — PATIENT HEALTH QUESTIONNAIRE - PHQ9
1. LITTLE INTEREST OR PLEASURE IN DOING THINGS: NOT AT ALL
2. FEELING DOWN, DEPRESSED OR HOPELESS: NOT AT ALL
SUM OF ALL RESPONSES TO PHQ9 QUESTIONS 1 AND 2: 0

## 2026-07-29 ENCOUNTER — APPOINTMENT (OUTPATIENT)
Dept: PRIMARY CARE | Facility: CLINIC | Age: 81
End: 2026-07-29
Payer: MEDICARE